# Patient Record
Sex: FEMALE | Race: WHITE | Employment: UNEMPLOYED | URBAN - NONMETROPOLITAN AREA
[De-identification: names, ages, dates, MRNs, and addresses within clinical notes are randomized per-mention and may not be internally consistent; named-entity substitution may affect disease eponyms.]

---

## 2024-07-30 ENCOUNTER — APPOINTMENT (INPATIENT)
Dept: ULTRASOUND IMAGING | Facility: HOSPITAL | Age: 44
DRG: 720 | End: 2024-07-30
Payer: COMMERCIAL

## 2024-07-30 ENCOUNTER — HOSPITAL ENCOUNTER (INPATIENT)
Facility: HOSPITAL | Age: 44
LOS: 3 days | Discharge: HOME | DRG: 720 | End: 2024-08-02
Attending: EMERGENCY MEDICINE | Admitting: HOSPITALIST
Payer: COMMERCIAL

## 2024-07-30 ENCOUNTER — APPOINTMENT (EMERGENCY)
Dept: CT IMAGING | Facility: HOSPITAL | Age: 44
DRG: 720 | End: 2024-07-30
Payer: COMMERCIAL

## 2024-07-30 ENCOUNTER — APPOINTMENT (EMERGENCY)
Dept: RADIOLOGY | Facility: HOSPITAL | Age: 44
DRG: 720 | End: 2024-07-30
Payer: COMMERCIAL

## 2024-07-30 DIAGNOSIS — N20.0 RENAL CALCULUS, LEFT: ICD-10-CM

## 2024-07-30 DIAGNOSIS — R78.81 BACTEREMIA DUE TO GRAM-NEGATIVE BACTERIA: ICD-10-CM

## 2024-07-30 DIAGNOSIS — A41.9 SEPSIS (HCC): ICD-10-CM

## 2024-07-30 DIAGNOSIS — R91.1 PULMONARY NODULE, RIGHT: ICD-10-CM

## 2024-07-30 DIAGNOSIS — E87.6 HYPOKALEMIA: ICD-10-CM

## 2024-07-30 DIAGNOSIS — N20.0 KIDNEY STONE: ICD-10-CM

## 2024-07-30 DIAGNOSIS — N12 PYELONEPHRITIS OF LEFT KIDNEY: Primary | ICD-10-CM

## 2024-07-30 PROBLEM — F41.9 ANXIETY: Status: ACTIVE | Noted: 2024-07-30

## 2024-07-30 LAB
ALBUMIN SERPL BCG-MCNC: 4.1 G/DL (ref 3.5–5)
ALP SERPL-CCNC: 68 U/L (ref 34–104)
ALT SERPL W P-5'-P-CCNC: 9 U/L (ref 7–52)
ANION GAP SERPL CALCULATED.3IONS-SCNC: 15 MMOL/L (ref 4–13)
APTT PPP: 30 SECONDS (ref 23–37)
AST SERPL W P-5'-P-CCNC: 9 U/L (ref 13–39)
BACTERIA UR QL AUTO: ABNORMAL /HPF
BASOPHILS # BLD MANUAL: 0 THOUSAND/UL (ref 0–0.1)
BASOPHILS NFR MAR MANUAL: 0 % (ref 0–1)
BILIRUB SERPL-MCNC: 1.11 MG/DL (ref 0.2–1)
BILIRUB UR QL STRIP: NEGATIVE
BUN SERPL-MCNC: 10 MG/DL (ref 5–25)
CALCIUM SERPL-MCNC: 9.8 MG/DL (ref 8.4–10.2)
CHLORIDE SERPL-SCNC: 103 MMOL/L (ref 96–108)
CLARITY UR: CLEAR
CO2 SERPL-SCNC: 15 MMOL/L (ref 21–32)
COLOR UR: YELLOW
CREAT SERPL-MCNC: 1.07 MG/DL (ref 0.6–1.3)
EOSINOPHIL # BLD MANUAL: 0 THOUSAND/UL (ref 0–0.4)
EOSINOPHIL NFR BLD MANUAL: 0 % (ref 0–6)
ERYTHROCYTE [DISTWIDTH] IN BLOOD BY AUTOMATED COUNT: 13.4 % (ref 11.6–15.1)
GFR SERPL CREATININE-BSD FRML MDRD: 63 ML/MIN/1.73SQ M
GLUCOSE SERPL-MCNC: 192 MG/DL (ref 65–140)
GLUCOSE UR STRIP-MCNC: NEGATIVE MG/DL
HCG SERPL QL: NEGATIVE
HCT VFR BLD AUTO: 36.3 % (ref 34.8–46.1)
HGB BLD-MCNC: 11.7 G/DL (ref 11.5–15.4)
HGB UR QL STRIP.AUTO: ABNORMAL
INR PPP: 1.31 (ref 0.84–1.19)
KETONES UR STRIP-MCNC: ABNORMAL MG/DL
LACTATE SERPL-SCNC: 1.1 MMOL/L (ref 0.5–2)
LACTATE SERPL-SCNC: 2.5 MMOL/L (ref 0.5–2)
LEUKOCYTE ESTERASE UR QL STRIP: ABNORMAL
LYMPHOCYTES # BLD AUTO: 0.5 THOUSAND/UL (ref 0.6–4.47)
LYMPHOCYTES # BLD AUTO: 3 % (ref 14–44)
MCH RBC QN AUTO: 26.6 PG (ref 26.8–34.3)
MCHC RBC AUTO-ENTMCNC: 32.2 G/DL (ref 31.4–37.4)
MCV RBC AUTO: 83 FL (ref 82–98)
MONOCYTES # BLD AUTO: 0.17 THOUSAND/UL (ref 0–1.22)
MONOCYTES NFR BLD: 1 % (ref 4–12)
NEUTROPHILS # BLD MANUAL: 15.96 THOUSAND/UL (ref 1.85–7.62)
NEUTS SEG NFR BLD AUTO: 96 % (ref 43–75)
NITRITE UR QL STRIP: NEGATIVE
NON-SQ EPI CELLS URNS QL MICRO: ABNORMAL /HPF
PH UR STRIP.AUTO: 5.5 [PH]
PLATELET # BLD AUTO: 207 THOUSANDS/UL (ref 149–390)
PLATELET BLD QL SMEAR: ADEQUATE
PMV BLD AUTO: 10.7 FL (ref 8.9–12.7)
POTASSIUM SERPL-SCNC: 3.2 MMOL/L (ref 3.5–5.3)
PROCALCITONIN SERPL-MCNC: 5.25 NG/ML
PROT SERPL-MCNC: 7.7 G/DL (ref 6.4–8.4)
PROT UR STRIP-MCNC: ABNORMAL MG/DL
PROTHROMBIN TIME: 16.1 SECONDS (ref 11.6–14.5)
RBC # BLD AUTO: 4.4 MILLION/UL (ref 3.81–5.12)
RBC #/AREA URNS AUTO: ABNORMAL /HPF
RBC MORPH BLD: NORMAL
SODIUM SERPL-SCNC: 133 MMOL/L (ref 135–147)
SP GR UR STRIP.AUTO: 1.01 (ref 1–1.03)
UROBILINOGEN UR STRIP-ACNC: <2 MG/DL
WBC # BLD AUTO: 16.62 THOUSAND/UL (ref 4.31–10.16)
WBC #/AREA URNS AUTO: ABNORMAL /HPF

## 2024-07-30 PROCEDURE — 80053 COMPREHEN METABOLIC PANEL: CPT | Performed by: EMERGENCY MEDICINE

## 2024-07-30 PROCEDURE — 85007 BL SMEAR W/DIFF WBC COUNT: CPT | Performed by: EMERGENCY MEDICINE

## 2024-07-30 PROCEDURE — 87154 CUL TYP ID BLD PTHGN 6+ TRGT: CPT | Performed by: PHYSICIAN ASSISTANT

## 2024-07-30 PROCEDURE — 96367 TX/PROPH/DG ADDL SEQ IV INF: CPT

## 2024-07-30 PROCEDURE — 85610 PROTHROMBIN TIME: CPT | Performed by: PHYSICIAN ASSISTANT

## 2024-07-30 PROCEDURE — 99285 EMERGENCY DEPT VISIT HI MDM: CPT

## 2024-07-30 PROCEDURE — 74018 RADEX ABDOMEN 1 VIEW: CPT

## 2024-07-30 PROCEDURE — 87040 BLOOD CULTURE FOR BACTERIA: CPT | Performed by: PHYSICIAN ASSISTANT

## 2024-07-30 PROCEDURE — 96365 THER/PROPH/DIAG IV INF INIT: CPT

## 2024-07-30 PROCEDURE — 99285 EMERGENCY DEPT VISIT HI MDM: CPT | Performed by: PHYSICIAN ASSISTANT

## 2024-07-30 PROCEDURE — 87186 SC STD MICRODIL/AGAR DIL: CPT | Performed by: HOSPITALIST

## 2024-07-30 PROCEDURE — 81001 URINALYSIS AUTO W/SCOPE: CPT | Performed by: EMERGENCY MEDICINE

## 2024-07-30 PROCEDURE — 96376 TX/PRO/DX INJ SAME DRUG ADON: CPT

## 2024-07-30 PROCEDURE — 87077 CULTURE AEROBIC IDENTIFY: CPT | Performed by: HOSPITALIST

## 2024-07-30 PROCEDURE — 84145 PROCALCITONIN (PCT): CPT | Performed by: PHYSICIAN ASSISTANT

## 2024-07-30 PROCEDURE — 96361 HYDRATE IV INFUSION ADD-ON: CPT

## 2024-07-30 PROCEDURE — 85027 COMPLETE CBC AUTOMATED: CPT | Performed by: EMERGENCY MEDICINE

## 2024-07-30 PROCEDURE — 85730 THROMBOPLASTIN TIME PARTIAL: CPT | Performed by: PHYSICIAN ASSISTANT

## 2024-07-30 PROCEDURE — 99223 1ST HOSP IP/OBS HIGH 75: CPT | Performed by: HOSPITALIST

## 2024-07-30 PROCEDURE — 76775 US EXAM ABDO BACK WALL LIM: CPT

## 2024-07-30 PROCEDURE — 99232 SBSQ HOSP IP/OBS MODERATE 35: CPT | Performed by: RADIOLOGY

## 2024-07-30 PROCEDURE — 74177 CT ABD & PELVIS W/CONTRAST: CPT

## 2024-07-30 PROCEDURE — 87186 SC STD MICRODIL/AGAR DIL: CPT | Performed by: PHYSICIAN ASSISTANT

## 2024-07-30 PROCEDURE — 93005 ELECTROCARDIOGRAM TRACING: CPT

## 2024-07-30 PROCEDURE — 84703 CHORIONIC GONADOTROPIN ASSAY: CPT | Performed by: PHYSICIAN ASSISTANT

## 2024-07-30 PROCEDURE — 83605 ASSAY OF LACTIC ACID: CPT | Performed by: PHYSICIAN ASSISTANT

## 2024-07-30 PROCEDURE — 87086 URINE CULTURE/COLONY COUNT: CPT | Performed by: HOSPITALIST

## 2024-07-30 PROCEDURE — 36415 COLL VENOUS BLD VENIPUNCTURE: CPT

## 2024-07-30 PROCEDURE — 96375 TX/PRO/DX INJ NEW DRUG ADDON: CPT

## 2024-07-30 RX ORDER — HYDROXYZINE HYDROCHLORIDE 25 MG/1
25 TABLET, FILM COATED ORAL EVERY 6 HOURS PRN
Status: DISCONTINUED | OUTPATIENT
Start: 2024-07-30 | End: 2024-08-02 | Stop reason: HOSPADM

## 2024-07-30 RX ORDER — CEFTRIAXONE 1 G/50ML
1000 INJECTION, SOLUTION INTRAVENOUS ONCE
Status: COMPLETED | OUTPATIENT
Start: 2024-07-30 | End: 2024-07-30

## 2024-07-30 RX ORDER — CEFTRIAXONE 2 G/50ML
2000 INJECTION, SOLUTION INTRAVENOUS EVERY 24 HOURS
Status: DISCONTINUED | OUTPATIENT
Start: 2024-07-31 | End: 2024-08-02 | Stop reason: HOSPADM

## 2024-07-30 RX ORDER — ONDANSETRON 2 MG/ML
4 INJECTION INTRAMUSCULAR; INTRAVENOUS EVERY 8 HOURS PRN
Status: DISCONTINUED | OUTPATIENT
Start: 2024-07-30 | End: 2024-08-01

## 2024-07-30 RX ORDER — SODIUM CHLORIDE, SODIUM LACTATE, POTASSIUM CHLORIDE, CALCIUM CHLORIDE 600; 310; 30; 20 MG/100ML; MG/100ML; MG/100ML; MG/100ML
125 INJECTION, SOLUTION INTRAVENOUS CONTINUOUS
Status: DISCONTINUED | OUTPATIENT
Start: 2024-07-30 | End: 2024-08-02 | Stop reason: HOSPADM

## 2024-07-30 RX ORDER — ONDANSETRON 2 MG/ML
4 INJECTION INTRAMUSCULAR; INTRAVENOUS ONCE
Status: COMPLETED | OUTPATIENT
Start: 2024-07-30 | End: 2024-07-30

## 2024-07-30 RX ORDER — ACETAMINOPHEN 325 MG/1
650 TABLET ORAL EVERY 6 HOURS PRN
Status: DISCONTINUED | OUTPATIENT
Start: 2024-07-30 | End: 2024-08-02 | Stop reason: HOSPADM

## 2024-07-30 RX ORDER — ENOXAPARIN SODIUM 100 MG/ML
40 INJECTION SUBCUTANEOUS DAILY
Status: DISCONTINUED | OUTPATIENT
Start: 2024-07-31 | End: 2024-08-02 | Stop reason: HOSPADM

## 2024-07-30 RX ORDER — ACETAMINOPHEN 10 MG/ML
1000 INJECTION, SOLUTION INTRAVENOUS ONCE
Status: COMPLETED | OUTPATIENT
Start: 2024-07-30 | End: 2024-07-30

## 2024-07-30 RX ORDER — POTASSIUM CHLORIDE 14.9 MG/ML
20 INJECTION INTRAVENOUS
Status: COMPLETED | OUTPATIENT
Start: 2024-07-30 | End: 2024-07-30

## 2024-07-30 RX ORDER — KETOROLAC TROMETHAMINE 30 MG/ML
15 INJECTION, SOLUTION INTRAMUSCULAR; INTRAVENOUS ONCE
Status: COMPLETED | OUTPATIENT
Start: 2024-07-30 | End: 2024-07-30

## 2024-07-30 RX ORDER — CEFTRIAXONE 2 G/50ML
2000 INJECTION, SOLUTION INTRAVENOUS ONCE
Status: DISCONTINUED | OUTPATIENT
Start: 2024-07-31 | End: 2024-07-30

## 2024-07-30 RX ADMIN — IOHEXOL 100 ML: 350 INJECTION, SOLUTION INTRAVENOUS at 13:24

## 2024-07-30 RX ADMIN — POTASSIUM CHLORIDE 20 MEQ: 14.9 INJECTION, SOLUTION INTRAVENOUS at 21:13

## 2024-07-30 RX ADMIN — ACETAMINOPHEN 650 MG: 325 TABLET, FILM COATED ORAL at 22:59

## 2024-07-30 RX ADMIN — SODIUM CHLORIDE 1000 ML: 0.9 INJECTION, SOLUTION INTRAVENOUS at 14:15

## 2024-07-30 RX ADMIN — ONDANSETRON 4 MG: 2 INJECTION INTRAMUSCULAR; INTRAVENOUS at 12:22

## 2024-07-30 RX ADMIN — POTASSIUM CHLORIDE 20 MEQ: 14.9 INJECTION, SOLUTION INTRAVENOUS at 19:41

## 2024-07-30 RX ADMIN — ONDANSETRON 4 MG: 2 INJECTION INTRAMUSCULAR; INTRAVENOUS at 21:11

## 2024-07-30 RX ADMIN — SODIUM CHLORIDE, SODIUM LACTATE, POTASSIUM CHLORIDE, AND CALCIUM CHLORIDE 125 ML/HR: .6; .31; .03; .02 INJECTION, SOLUTION INTRAVENOUS at 18:36

## 2024-07-30 RX ADMIN — SODIUM CHLORIDE 1000 ML: 0.9 INJECTION, SOLUTION INTRAVENOUS at 12:22

## 2024-07-30 RX ADMIN — CEFTRIAXONE 1000 MG: 1 INJECTION, SOLUTION INTRAVENOUS at 14:14

## 2024-07-30 RX ADMIN — KETOROLAC TROMETHAMINE 15 MG: 30 INJECTION, SOLUTION INTRAMUSCULAR at 13:26

## 2024-07-30 RX ADMIN — ONDANSETRON 4 MG: 2 INJECTION INTRAMUSCULAR; INTRAVENOUS at 13:40

## 2024-07-30 RX ADMIN — ACETAMINOPHEN 1000 MG: 1000 INJECTION, SOLUTION INTRAVENOUS at 12:38

## 2024-07-30 NOTE — PLAN OF CARE
Problem: PAIN - ADULT  Goal: Verbalizes/displays adequate comfort level or baseline comfort level  Description: Interventions:  - Encourage patient to monitor pain and request assistance  - Assess pain using appropriate pain scale  - Administer analgesics based on type and severity of pain and evaluate response  - Implement non-pharmacological measures as appropriate and evaluate response  - Consider cultural and social influences on pain and pain management  - Notify physician/advanced practitioner if interventions unsuccessful or patient reports new pain  Outcome: Progressing     Problem: DISCHARGE PLANNING  Goal: Discharge to home or other facility with appropriate resources  Description: INTERVENTIONS:  - Identify barriers to discharge w/patient and caregiver  - Arrange for needed discharge resources and transportation as appropriate  - Identify discharge learning needs (meds, wound care, etc.)  - Arrange for interpretive services to assist at discharge as needed  - Refer to Case Management Department for coordinating discharge planning if the patient needs post-hospital services based on physician/advanced practitioner order or complex needs related to functional status, cognitive ability, or social support system  Outcome: Progressing     Problem: Knowledge Deficit  Goal: Patient/family/caregiver demonstrates understanding of disease process, treatment plan, medications, and discharge instructions  Description: Complete learning assessment and assess knowledge base.  Interventions:  - Provide teaching at level of understanding  - Provide teaching via preferred learning methods  Outcome: Progressing     Problem: RESPIRATORY - ADULT  Goal: Achieves optimal ventilation and oxygenation  Description: INTERVENTIONS:  - Assess for changes in respiratory status  - Assess for changes in mentation and behavior  - Position to facilitate oxygenation and minimize respiratory effort  - Oxygen administered by appropriate  delivery if ordered  - Initiate smoking cessation education as indicated  - Encourage broncho-pulmonary hygiene including cough, deep breathe, Incentive Spirometry  - Assess the need for suctioning and aspirate as needed  - Assess and instruct to report SOB or any respiratory difficulty  - Respiratory Therapy support as indicated  Outcome: Progressing     Problem: METABOLIC, FLUID AND ELECTROLYTES - ADULT  Goal: Electrolytes maintained within normal limits  Description: INTERVENTIONS:  - Monitor labs and assess patient for signs and symptoms of electrolyte imbalances  - Administer electrolyte replacement as ordered  - Monitor response to electrolyte replacements, including repeat lab results as appropriate  - Instruct patient on fluid and nutrition as appropriate  Outcome: Progressing  Goal: Fluid balance maintained  Description: INTERVENTIONS:  - Monitor labs   - Monitor I/O and WT  - Instruct patient on fluid and nutrition as appropriate  - Assess for signs & symptoms of volume excess or deficit  Outcome: Progressing     Problem: MUSCULOSKELETAL - ADULT  Goal: Maintain or return mobility to safest level of function  Description: INTERVENTIONS:  - Assess patient's ability to carry out ADLs; assess patient's baseline for ADL function and identify physical deficits which impact ability to perform ADLs (bathing, care of mouth/teeth, toileting, grooming, dressing, etc.)  - Assess/evaluate cause of self-care deficits   - Assess range of motion  - Assess patient's mobility  - Assess patient's need for assistive devices and provide as appropriate  - Encourage maximum independence but intervene and supervise when necessary  - Involve family in performance of ADLs  - Assess for home care needs following discharge   - Consider OT consult to assist with ADL evaluation and planning for discharge  - Provide patient education as appropriate  Outcome: Progressing  Goal: Maintain proper alignment of affected body  part  Description: INTERVENTIONS:  - Support, maintain and protect limb and body alignment  - Provide patient/ family with appropriate education  Outcome: Progressing      No

## 2024-07-30 NOTE — ED PROVIDER NOTES
History  Chief Complaint   Patient presents with    Flank Pain     Pt c/o left sided flank pain that radiates to the front since , pt has hx of kidney stones. Pt c/o vomiting and chills since yesterday      Patient is a 44-year-old female presenting to the ED for evaluation of left flank pain x 3 days.  Patient states that she woke up  with left-sided flank pain that has been progressively worsening over the past few days.  She states that the pain intermittently radiates around to the left side of the abdomen.  She reports associated nausea and has had multiple episodes of nonbloody/nonbilious vomiting.  She has also had subjective fevers and chills.  She denies any diarrhea, constipation or urinary symptoms.  She reports a history of kidney stones but states that this feels slightly different.        None       History reviewed. No pertinent past medical history.    Past Surgical History:   Procedure Laterality Date     SECTION             History reviewed. No pertinent family history.  I have reviewed and agree with the history as documented.    E-Cigarette/Vaping     E-Cigarette/Vaping Substances     Social History     Tobacco Use    Smoking status: Former     Current packs/day: 0.00     Types: Cigarettes     Quit date:      Years since quittin.5    Smokeless tobacco: Never       Review of Systems   Constitutional:  Positive for chills and fever.   HENT:  Negative for congestion, rhinorrhea and sore throat.    Eyes:  Negative for visual disturbance.   Respiratory:  Negative for cough and shortness of breath.    Cardiovascular:  Negative for chest pain, palpitations and leg swelling.   Gastrointestinal:  Positive for abdominal pain, nausea and vomiting. Negative for constipation and diarrhea.   Genitourinary:  Positive for flank pain. Negative for dysuria, frequency and hematuria.   Musculoskeletal:  Negative for back pain and neck pain.   Skin:  Negative for rash.   Neurological:   Negative for dizziness, syncope, weakness and headaches.   All other systems reviewed and are negative.      Physical Exam  Physical Exam  Vitals and nursing note reviewed.   Constitutional:       General: She is awake.      Appearance: Normal appearance. She is well-developed. She is not toxic-appearing or diaphoretic.   HENT:      Head: Normocephalic and atraumatic.      Right Ear: External ear normal.      Left Ear: External ear normal.      Nose: Nose normal.      Mouth/Throat:      Lips: Pink.      Mouth: Mucous membranes are moist.   Eyes:      General: Lids are normal. No scleral icterus.     Conjunctiva/sclera: Conjunctivae normal.      Pupils: Pupils are equal, round, and reactive to light.   Cardiovascular:      Rate and Rhythm: Regular rhythm. Tachycardia present.      Pulses: Normal pulses.           Radial pulses are 2+ on the right side and 2+ on the left side.      Heart sounds: Normal heart sounds, S1 normal and S2 normal.   Pulmonary:      Effort: Pulmonary effort is normal. No accessory muscle usage.      Breath sounds: Normal breath sounds. No stridor. No decreased breath sounds, wheezing, rhonchi or rales.   Abdominal:      General: Abdomen is flat. Bowel sounds are normal. There is no distension.      Palpations: Abdomen is soft.      Tenderness: There is abdominal tenderness in the left lower quadrant. There is left CVA tenderness. There is no right CVA tenderness, guarding or rebound.   Musculoskeletal:      Cervical back: Full passive range of motion without pain and neck supple. No signs of trauma. No pain with movement.      Right lower leg: No edema.      Left lower leg: No edema.   Lymphadenopathy:      Cervical: No cervical adenopathy.   Skin:     General: Skin is warm and dry.      Capillary Refill: Capillary refill takes less than 2 seconds.      Coloration: Skin is not cyanotic, jaundiced or pale.   Neurological:      Mental Status: She is alert and oriented to person, place, and  time.      GCS: GCS eye subscore is 4. GCS verbal subscore is 5. GCS motor subscore is 6.      Gait: Gait normal.   Psychiatric:         Mood and Affect: Mood normal.         Speech: Speech normal.         Behavior: Behavior is cooperative.         Vital Signs  ED Triage Vitals   Temperature Pulse Respirations Blood Pressure SpO2   07/30/24 1205 07/30/24 1200 07/30/24 1200 07/30/24 1200 07/30/24 1200   100.5 °F (38.1 °C) (!) 129 20 125/78 97 %      Temp Source Heart Rate Source Patient Position - Orthostatic VS BP Location FiO2 (%)   07/30/24 1205 07/30/24 1200 07/30/24 1200 07/30/24 1200 --   Oral Monitor Lying Left arm       Pain Score       07/30/24 1200       6           Vitals:    07/30/24 1230 07/30/24 1532 07/30/24 1748 07/30/24 1821   BP: 137/71 112/71 115/79 113/79   Pulse: (!) 117 89 80 86   Patient Position - Orthostatic VS: Sitting   Sitting         Visual Acuity      ED Medications  Medications   lactated ringers infusion (125 mL/hr Intravenous New Bag 7/30/24 1836)   enoxaparin (LOVENOX) subcutaneous injection 40 mg (has no administration in time range)   hydrOXYzine HCL (ATARAX) tablet 25 mg (has no administration in time range)   ondansetron (ZOFRAN) injection 4 mg (has no administration in time range)   cefTRIAXone (ROCEPHIN) IVPB (premix in dextrose) 2,000 mg 50 mL (has no administration in time range)   potassium chloride 20 mEq IVPB (premix) (has no administration in time range)   sodium chloride 0.9 % bolus 1,000 mL (0 mL Intravenous Stopped 7/30/24 1324)   ondansetron (ZOFRAN) injection 4 mg (4 mg Intravenous Given 7/30/24 1222)   acetaminophen (Ofirmev) injection 1,000 mg (0 mg Intravenous Stopped 7/30/24 1324)   ketorolac (TORADOL) injection 15 mg (15 mg Intravenous Given 7/30/24 1326)   iohexol (OMNIPAQUE) 350 MG/ML injection (SINGLE-DOSE) 100 mL (100 mL Intravenous Given 7/30/24 1324)   ondansetron (ZOFRAN) injection 4 mg (4 mg Intravenous Given 7/30/24 1340)   sodium chloride 0.9 % bolus  1,000 mL (0 mL Intravenous Stopped 7/30/24 1515)   cefTRIAXone (ROCEPHIN) IVPB (premix in dextrose) 1,000 mg 50 mL (0 mg Intravenous Stopped 7/30/24 1444)       Diagnostic Studies  Results Reviewed       Procedure Component Value Units Date/Time    Lactic acid 2 Hours [[id number]]  (Normal) Collected: 07/30/24 1501    Lab Status: Final result Specimen: Blood from Arm, Right Updated: 07/30/24 1520     LACTIC ACID 1.1 mmol/L     Narrative:      Result may be elevated if tourniquet was used during collection.    Urine Microscopic [[id number]]  (Abnormal) Collected: 07/30/24 1348    Lab Status: Final result Specimen: Urine, Clean Catch Updated: 07/30/24 1406     RBC, UA 4-10 /hpf      WBC, UA 4-10 /hpf      Epithelial Cells Occasional /hpf      Bacteria, UA Occasional /hpf     UA w Reflex to Microscopic w Reflex to Culture [[id number]]  (Abnormal) Collected: 07/30/24 1348    Lab Status: Final result Specimen: Urine, Clean Catch Updated: 07/30/24 1355     Color, UA Yellow     Clarity, UA Clear     Specific Gravity, UA 1.010     pH, UA 5.5     Leukocytes, UA Moderate     Nitrite, UA Negative     Protein, UA 30 (1+) mg/dl      Glucose, UA Negative mg/dl      Ketones, UA 40 (2+) mg/dl      Urobilinogen, UA <2.0 mg/dl      Bilirubin, UA Negative     Occult Blood, UA Small    Procalcitonin [[id number]]  (Abnormal) Collected: 07/30/24 1228    Lab Status: Final result Specimen: Blood Updated: 07/30/24 1306     Procalcitonin 5.25 ng/ml     hCG, qualitative pregnancy [[id number]]  (Normal) Collected: 07/30/24 1228    Lab Status: Final result Specimen: Blood Updated: 07/30/24 1301     Preg, Serum Negative    RBC Morphology Reflex Test [[id number]] Collected: 07/30/24 1213    Lab Status: Final result Specimen: Blood from Arm, Right Updated: 07/30/24 1301    Lactic acid [[id number]]  (Abnormal) Collected: 07/30/24 1221    Lab Status: Final result Specimen: Blood Updated: 07/30/24 1252     LACTIC ACID 2.5 mmol/L     Narrative:      Result  may be elevated if tourniquet was used during collection.    APTT [[id number]]  (Normal) Collected: 07/30/24 1228    Lab Status: Final result Specimen: Blood from Arm, Left Updated: 07/30/24 1250     PTT 30 seconds     Protime-INR [[id number]]  (Abnormal) Collected: 07/30/24 1228    Lab Status: Final result Specimen: Blood from Arm, Left Updated: 07/30/24 1250     Protime 16.1 seconds      INR 1.31    CBC and differential [[id number]]  (Abnormal) Collected: 07/30/24 1213    Lab Status: Final result Specimen: Blood from Arm, Right Updated: 07/30/24 1250     WBC 16.62 Thousand/uL      RBC 4.40 Million/uL      Hemoglobin 11.7 g/dL      Hematocrit 36.3 %      MCV 83 fL      MCH 26.6 pg      MCHC 32.2 g/dL      RDW 13.4 %      MPV 10.7 fL      Platelets 207 Thousands/uL     Narrative:      This is an appended report.  These results have been appended to a previously verified report.    Manual Differential(PHLEBS Do Not Order) [[id number]]  (Abnormal) Collected: 07/30/24 1213    Lab Status: Final result Specimen: Blood from Arm, Right Updated: 07/30/24 1250     Segmented % 96 %      Lymphocytes % 3 %      Monocytes % 1 %      Eosinophils % 0 %      Basophils % 0 %      Absolute Neutrophils 15.96 Thousand/uL      Absolute Lymphocytes 0.50 Thousand/uL      Absolute Monocytes 0.17 Thousand/uL      Absolute Eosinophils 0.00 Thousand/uL      Absolute Basophils 0.00 Thousand/uL      Total Counted --     RBC Morphology Normal     Platelet Estimate Adequate    Comprehensive metabolic panel [[id number]]  (Abnormal) Collected: 07/30/24 1214    Lab Status: Final result Specimen: Blood from Arm, Right Updated: 07/30/24 1236     Sodium 133 mmol/L      Potassium 3.2 mmol/L      Chloride 103 mmol/L      CO2 15 mmol/L      ANION GAP 15 mmol/L      BUN 10 mg/dL      Creatinine 1.07 mg/dL      Glucose 192 mg/dL      Calcium 9.8 mg/dL      AST 9 U/L      ALT 9 U/L      Alkaline Phosphatase 68 U/L      Total Protein 7.7 g/dL      Albumin 4.1 g/dL       Total Bilirubin 1.11 mg/dL      eGFR 63 ml/min/1.73sq m     Narrative:      [employer] guidelines for Chronic Kidney Disease (CKD):     Stage 1 with normal or high GFR (GFR > 90 mL/min/1.73 square meters)    Stage 2 Mild CKD (GFR = 60-89 mL/min/1.73 square meters)    Stage 3A Moderate CKD (GFR = 45-59 mL/min/1.73 square meters)    Stage 3B Moderate CKD (GFR = 30-44 mL/min/1.73 square meters)    Stage 4 Severe CKD (GFR = 15-29 mL/min/1.73 square meters)    Stage 5 End Stage CKD (GFR <15 mL/min/1.73 square meters)  Note: GFR calculation is accurate only with a steady state creatinine    Blood culture #2 [[id number]] Collected: 07/30/24 1228    Lab Status: In process Specimen: Blood from Hand, Left Updated: 07/30/24 1236    Blood culture #1 [[id number]] Collected: 07/30/24 1228    Lab Status: In process Specimen: Blood from Arm, Right Updated: 07/30/24 1236                   CT abdomen pelvis with contrast   Final Result by Tc Mujica MD (07/30 1433)      1.  Moderate left renal pelvocaliectasis, with heterogeneous enhancement throughout the left renal parenchyma. While there is no discrete obstructing calculus, there is nonspecific urothelial thickening and hyperemia at the left ureterovesicular    junction. While this potentially represents a recently passed calculus, the focal urothelial thickening could alternatively represent inflammation or less likely focal malignancy. The renal parenchymal heterogeneity is concerning for potential    superimposed pyelonephritis.   2.  There is an additional nonobstructing 8 mm left renal calculus.   3.  There are 2 small pulmonary nodules within the right lower lobe, the larger of which measures 4 mm. A baseline follow-up chest CT is recommended for further evaluation.         Workstation performed: [id number]         XR abdomen 1 view kub    (Results Pending)   US kidney and bladder    (Results Pending)         "      Procedures  Procedures         ED Course  ED Course as of 07/30/24 1845 Tue Jul 30, 2024   1245 WBC(!): 16.62   1246 Pulse(!): 129   1246 Temperature: 100.5 °F (38.1 °C)   1246 Potassium(!): 3.2   1246 ANION GAP(!): 15   1246 GLUCOSE(!): 192   1254 LACTIC ACID(!): 2.5   1308 Procalcitonin(!): 5.25                              Initial Sepsis Screening       Row Name 07/30/24 1820 07/30/24 1246             Is the patient's history suggestive of a new or worsening infection? Yes (Proceed)  -DC Yes (Proceed)  -       Suspected source of infection urinary tract infection  -DC urinary tract infection;acute abdominal infection  -LH       Indicate SIRS criteria Tachycardia > 90 bpm;Leukocytosis (WBC > 80508 IJL) OR Leukopenia (WBC <4000 IJL) OR Bandemia (WBC >10% bands)  -DC Hyperthemia > 38.3C (100.9F) OR Hypothermia <36C (96.8F);Tachycardia > 90 bpm;Leukocytosis (WBC > 78877 IJL) OR Leukopenia (WBC <4000 IJL) OR Bandemia (WBC >10% bands)  -       Are two or more of the above signs & symptoms of infection both present and new to the patient? Yes (Proceed)  -DC Yes (Proceed)  -       Assess for evidence of organ dysfunction: Are any of the below criteria present within 6 hours of suspected infection and SIRS criteria that are NOT considered to be chronic conditions? Lactate > 2.0  -DC --       Date of presentation of severe sepsis 07/30/24  -DC --       Time of presentation of severe sepsis 1823  -DC --       Sepsis Note: Click \"NEXT\" below (NOT \"close\") to generate sepsis note based on above information. YES (proceed by clicking \"NEXT\")  -DC --                 User Key  (r) = Recorded By, (t) = Taken By, (c) = Cosigned By      Initials Name Provider Type     Kanwal Sharma PA-C Physician Assistant    CHAUNCEY Payne Counts, DO Physician                    SBIRT 20yo+      Flowsheet Row Most Recent Value   Initial Alcohol Screen: US AUDIT-C     1. How often do you have a drink containing alcohol? 0 " Filed at: 07/30/2024 1203   2. How many drinks containing alcohol do you have on a typical day you are drinking?  0 Filed at: 07/30/2024 1203   3a. Male UNDER 65: How often do you have five or more drinks on one occasion? 0 Filed at: 07/30/2024 1203   3b. FEMALE Any Age, or MALE 65+: How often do you have 4 or more drinks on one occassion? 0 Filed at: 07/30/2024 1203   Audit-C Score 0 Filed at: 07/30/2024 1203   ARACELIS: How many times in the past year have you...    Used an illegal drug or used a prescription medication for non-medical reasons? Never Filed at: 07/30/2024 1203                      Medical Decision Making  Patient is a 44-year-old female presenting to the ED for evaluation of left flank pain x 3 days.      DDx including but not limited to: renal colic, pyelonephritis, UTI, musculoskeletal, GI etiology.     Patient met sepsis criteria with a fever, tachycardia, leukocytosis (WBC count 16.62) and an elevated lactic acid of 2.5 secondary to pyelonephritis seen on CT. CT showed findings consistent with pyelonephritis as well as moderate left renal pelvocaliectasis and moderate dilation of the left ureter with no discrete obstructing calculus.  The case was discussed with urology and IR who were considering a percutaneous nephrostomy tube; however, patient had significant improvement of symptoms and appeared to have passed the stone so it was ultimately recommended that she be admitted for observation and continued IV antibiotics.  Patient was given initial dose of IV antibiotics in the ED and admitted central medicine for continued observation and management.    Amount and/or Complexity of Data Reviewed  Labs: ordered. Decision-making details documented in ED Course.  Radiology: ordered.  Discussion of management or test interpretation with external provider(s): Mandie Cook PA-C and Dr. Tao (Urology)  Dr. Moon (IR)    Risk  Prescription drug management.  Decision regarding  hospitalization.                 Disposition  Final diagnoses:   Pyelonephritis of left kidney   Sepsis (HCC)   Hypokalemia   Renal calculus, left   Pulmonary nodule, right     Time reflects when diagnosis was documented in both MDM as applicable and the Disposition within this note       Time User Action Codes Description Comment    7/30/2024  4:28 PM Kanwal Sharma [N12] Pyelonephritis of left kidney     7/30/2024  4:28 PM Kanwal Sharma [A41.9] Sepsis (HCC)     7/30/2024  4:29 PM Kanwal Sharma [E87.6] Hypokalemia     7/30/2024  4:29 PM Kanwal Sharma Add [N20.0] Renal calculus, left     7/30/2024  4:29 PM Kanwal Sharma [R91.1] Pulmonary nodule, right           ED Disposition       ED Disposition   Admit    Condition   Stable    Date/Time   Tue Jul 30, 2024 0132    Comment   Case was discussed with PRAVEENA and the patient's admission status was agreed to be Admission Status: Inpatient status to the service of Dr. Lambert.               Follow-up Information    None         There are no discharge medications for this patient.      No discharge procedures on file.    PDMP Review       None            ED Provider  Electronically Signed by             Kanwal Sharma PA-C  07/30/24 3155

## 2024-07-30 NOTE — H&P
[facility]  H&P  Name: Leigh Torres 44 y.o. female I MRN: [id number]  Unit/Bed#: 419-01 I Date of Admission: 7/30/2024   Date of Service: 7/30/2024 I Hospital Day: 0      Assessment & Plan   * Sepsis without acute organ dysfunction (HCC)  Assessment & Plan  Tachycardic, leukocytosis, lactic acid 2.5  Suspected source pyelonephritis  Procalcitonin 5.25  Lactic acid improved after IVF in ED  Patient did receive 30 cc/kg in ED with 2L IVF, IV Tylenol, and IV Antibiotics  Suspect some of her sepsis criteria may have been mediated by dehydration, N/V, and pain however with suspected infection and notable procalcitonin  See under pyelonephritis  Blood cultures pending      Pyelonephritis  Assessment & Plan  Suspected, due to possible recently passed kidney stone  CT A/P: there is nonspecific urothelial thickening and hyperemia at the left ureterovesicular junction. While this potentially represents a recently passed calculus, the focal urothelial thickening could alternatively represent inflammation or less likely focal malignancy. The renal parenchymal heterogeneity is concerning for potential superimposed pyelonephritis.  IV Ceftriaxone  UA with only occasional bacteria, WBC and RBC present possibly in setting recent passed stone  Would continue empiric treatment for potential UTI/Pyelonephritis  Urine culture  Trend CBC, VS  IVF    Kidney stone  Assessment & Plan  Presents with 2 days left flank pain, N/V  CT A/P shows evidence of possible recently passed kidney stone, she also has additional nonobstructing 8 mm left renal calculus.  Follow up KUB was reviewed by IR, and appears to have b/l excretion  Symptoms improving, near resolved  Will need Urology follow up given kidney stone  Will repeat Renal US in the morning to assess ureters/kidney for improvement  IVF      Hypokalemia  Assessment & Plan  Suspect due to N/V  Replete  Follow BMP in morning    Pulmonary nodule  Assessment &  Plan  Incidental findings  There are 2 small pulmonary nodules within the right lower lobe, the larger of which measures 4 mm.   Follow up Chest CT as outpatient    Anxiety  Assessment & Plan  Uses medical marijuana  Will utilize PRN atarax           VTE Pharmacologic Prophylaxis: VTE Score: 3 Moderate Risk (Score 3-4) - Pharmacological DVT Prophylaxis Ordered: enoxaparin (Lovenox).  Code Status: Level 1 - Full Code   Discussion with family: Patient declined call to .     Anticipated Length of Stay: Patient will be admitted on an inpatient basis with an anticipated length of stay of greater than 2 midnights secondary to sepsis, pyelo, kidney stone.    Total Time Spent on Date of Encounter in care of patient: 45 mins. This time was spent on one or more of the following: performing physical exam; counseling and coordination of care; obtaining or reviewing history; documenting in the medical record; reviewing/ordering tests, medications or procedures; communicating with other healthcare professionals and discussing with patient's family/caregivers.    Chief Complaint: flank pain    History of Present Illness:  Leigh Torres is a 44 y.o. female with a PMH of anxiety and Hx of kidney stones who presents with left flank pain.  Patient has been having left flank pain for 48 hours prior to admission, progressively worsening, associated with nausea and vomiting and poor oral intake due to nausea.  Patient has been taking NSAIDs to relieve the pain with little improvement.  She reports she has had some intermittent discomfort with urinating but not necessarily burning sensation or increased frequency or urgency, consistent with her prior kidney stones.  Patient presented to the emergency department and CT of the abdomen showed abnormal findings consistent with possible recently passed kidney stone versus possible superimposed pyelonephritis.  Patient did meet SIRS criteria and considered septic.  Emergency  department spoke with urology and interventional radiology, who were considering percutaneous nephrostomy tube, however reviewed the case further and given patient had notable improvement of symptoms and appeared to have passed her kidney stone, no further intervention at this time and recommended further observation.    Review of Systems:  Review of Systems   Constitutional:  Positive for chills. Negative for fever.   HENT:  Negative for ear pain and sore throat.    Eyes:  Negative for pain and visual disturbance.   Respiratory:  Negative for cough and shortness of breath.    Cardiovascular:  Negative for chest pain and palpitations.   Gastrointestinal:  Positive for abdominal pain, nausea and vomiting.   Genitourinary:  Positive for hematuria. Negative for dysuria.   Musculoskeletal:  Negative for arthralgias and back pain.   Skin:  Negative for color change and rash.   Neurological:  Negative for seizures and syncope.   All other systems reviewed and are negative.      Past Medical and Surgical History:   History reviewed. No pertinent past medical history.    Past Surgical History:   Procedure Laterality Date     SECTION             Meds/Allergies:  Prior to Admission medications    Not on File     I have reviewed home medications with patient personally.    Allergies:   Allergies   Allergen Reactions    Shellfish-Derived Products - Food Allergy Anaphylaxis       Social History:  Marital Status: /Civil Union   Occupation: n/a  Patient Pre-hospital Living Situation: Home  Patient Pre-hospital Level of Mobility: walks  Patient Pre-hospital Diet Restrictions: none  Substance Use History:   Social History     Substance and Sexual Activity   Alcohol Use None     Social History     Tobacco Use   Smoking Status Former    Current packs/day: 0.00    Types: Cigarettes    Quit date:     Years since quittin.5   Smokeless Tobacco Never     Social History     Substance and Sexual Activity   Drug Use  "Not on file       Family History:  History reviewed. No pertinent family history.    Physical Exam:     Vitals:   Blood Pressure: 113/79 (07/30/24 1821)  Pulse: 86 (07/30/24 1821)  Temperature: 97.9 °F (36.6 °C) (07/30/24 1821)  Temp Source: Oral (07/30/24 1821)  Respirations: 20 (07/30/24 1821)  Height: 5' 2\" (157.5 cm) (07/30/24 1821)  Weight - Scale: 83.7 kg (184 lb 8.4 oz) (07/30/24 1821)  SpO2: 99 % (07/30/24 1821)    Physical Exam  Vitals and nursing note reviewed.   Constitutional:       General: She is not in acute distress.     Appearance: She is well-developed.   HENT:      Head: Normocephalic and atraumatic.   Eyes:      Conjunctiva/sclera: Conjunctivae normal.   Cardiovascular:      Rate and Rhythm: Normal rate and regular rhythm.      Heart sounds: No murmur heard.  Pulmonary:      Effort: Pulmonary effort is normal. No respiratory distress.      Breath sounds: Normal breath sounds.   Abdominal:      Palpations: Abdomen is soft.      Comments: Mild abdominal tenderness over the left flank   Musculoskeletal:         General: No swelling.      Cervical back: Neck supple.   Skin:     General: Skin is warm and dry.      Capillary Refill: Capillary refill takes less than 2 seconds.   Neurological:      Mental Status: She is alert.   Psychiatric:         Mood and Affect: Mood normal.          Additional Data:     Lab Results:  Results from last 7 days   Lab Units 07/30/24  1213   WBC Thousand/uL 16.62*   HEMOGLOBIN g/dL 11.7   HEMATOCRIT % 36.3   PLATELETS Thousands/uL 207   LYMPHO PCT % 3*   MONO PCT % 1*   EOS PCT % 0     Results from last 7 days   Lab Units 07/30/24  1214   SODIUM mmol/L 133*   POTASSIUM mmol/L 3.2*   CHLORIDE mmol/L 103   CO2 mmol/L 15*   BUN mg/dL 10   CREATININE mg/dL 1.07   ANION GAP mmol/L 15*   CALCIUM mg/dL 9.8   ALBUMIN g/dL 4.1   TOTAL BILIRUBIN mg/dL 1.11*   ALK PHOS U/L 68   ALT U/L 9   AST U/L 9*   GLUCOSE RANDOM mg/dL 192*     Results from last 7 days   Lab Units " "07/30/24  1228   INR  1.31*         No results found for: \"HGBA1C\"  Results from last 7 days   Lab Units 07/30/24  1501 07/30/24  1228 07/30/24  1221   LACTIC ACID mmol/L 1.1  --  2.5*   PROCALCITONIN ng/ml  --  5.25*  --        Lines/Drains:  Invasive Devices       Peripheral Intravenous Line  Duration             Peripheral IV 07/30/24 Right Antecubital <1 day                        Imaging: Reviewed radiology reports from this admission including: abdominal/pelvic CT and xray(s)  CT abdomen pelvis with contrast   Final Result by Tc Mujica MD (07/30 1433)      1.  Moderate left renal pelvocaliectasis, with heterogeneous enhancement throughout the left renal parenchyma. While there is no discrete obstructing calculus, there is nonspecific urothelial thickening and hyperemia at the left ureterovesicular    junction. While this potentially represents a recently passed calculus, the focal urothelial thickening could alternatively represent inflammation or less likely focal malignancy. The renal parenchymal heterogeneity is concerning for potential    superimposed pyelonephritis.   2.  There is an additional nonobstructing 8 mm left renal calculus.   3.  There are 2 small pulmonary nodules within the right lower lobe, the larger of which measures 4 mm. A baseline follow-up chest CT is recommended for further evaluation.         Workstation performed: [id number]         XR abdomen 1 view kub    (Results Pending)   US kidney and bladder    (Results Pending)       EKG and Other Studies Reviewed on Admission:   EKG: NSR. .    ** Please Note: This note has been constructed using a voice recognition system. **    "

## 2024-07-30 NOTE — ASSESSMENT & PLAN NOTE
Incidental findings  There are 2 small pulmonary nodules within the right lower lobe, the larger of which measures 4 mm.   Follow up Chest CT as outpatient

## 2024-07-30 NOTE — ASSESSMENT & PLAN NOTE
Tachycardic, leukocytosis, lactic acid 2.5  Suspected source pyelonephritis  Procalcitonin 5.25  Lactic acid improved after IVF in ED  Patient did receive 30 cc/kg in ED with 2L IVF, IV Tylenol, and IV Antibiotics  Suspect some of her sepsis criteria may have been mediated by dehydration, N/V, and pain however with suspected infection and notable procalcitonin  See under pyelonephritis  Blood cultures pending

## 2024-07-30 NOTE — Clinical Note
Case was discussed with PRAVEENA and the patient's admission status was agreed to be Admission Status: observation status to the service of Dr. Lambert.

## 2024-07-30 NOTE — ASSESSMENT & PLAN NOTE
Suspected, due to possible recently passed kidney stone  CT A/P: there is nonspecific urothelial thickening and hyperemia at the left ureterovesicular junction. While this potentially represents a recently passed calculus, the focal urothelial thickening could alternatively represent inflammation or less likely focal malignancy. The renal parenchymal heterogeneity is concerning for potential superimposed pyelonephritis.  IV Ceftriaxone  UA with only occasional bacteria, WBC and RBC present possibly in setting recent passed stone  Would continue empiric treatment for potential UTI/Pyelonephritis  Urine culture  Trend CBC, VS  IVF

## 2024-07-30 NOTE — PROGRESS NOTES
Patient who presented with left-sided abdomen pain.  History of kidney stones.  CT scan shows left-sided hydronephrosis without obstructing lesion.  There is some atypical enhancement at the ureterovesical junction.  There is some residual stone within the renal pelvis.  When I went down to see her, she was feeling better.  I came back a little while later.  She is comfortable at rest.  Pain was resolved.  Her nausea is waning.    I had a long discussion with her about the options including transfer for retrograde stent, nephrostomy, here, today, versus watch and wait.    I got an abdomen film.  There is faint contrast on the left.  It is difficult to compare the plain film to the CT, but I believe the hydronephrosis may be slightly less.  It certainly is not worse.  The study was done portably.  I asked for an in department study, for better resolution.    At this point, I am uncertain if she passed a stone, and there is abnormal enhancement of the distal ureter.  As I reviewed with the patient, and her significant other, it is also possible that she has a primary abnormality of the distal ureter, which has resulted in stone formation.  She will need follow-up of the distal ureter.    For right now, I think she has passed a stone and I am comfortable with observation.  In the long-term, she will need follow-up.  Emergency and internal medicine will discuss treatment for pyelonephritis.

## 2024-07-30 NOTE — ASSESSMENT & PLAN NOTE
Presents with 2 days left flank pain, N/V  CT A/P shows evidence of possible recently passed kidney stone, she also has additional nonobstructing 8 mm left renal calculus.  Follow up KUB was reviewed by IR, and appears to have b/l excretion  Symptoms improving, near resolved  Will need Urology follow up given kidney stone  Will repeat Renal US in the morning to assess ureters/kidney for improvement  IVF

## 2024-07-31 ENCOUNTER — TELEPHONE (OUTPATIENT)
Dept: OTHER | Facility: HOSPITAL | Age: 44
End: 2024-07-31

## 2024-07-31 DIAGNOSIS — N28.9 LESION OF LEFT NATIVE URETER: Primary | ICD-10-CM

## 2024-07-31 PROBLEM — R78.81 BACTEREMIA DUE TO GRAM-NEGATIVE BACTERIA: Status: ACTIVE | Noted: 2024-07-31

## 2024-07-31 LAB
ANION GAP SERPL CALCULATED.3IONS-SCNC: 10 MMOL/L (ref 4–13)
ATRIAL RATE: 107 BPM
BUN SERPL-MCNC: 9 MG/DL (ref 5–25)
CALCIUM SERPL-MCNC: 9.2 MG/DL (ref 8.4–10.2)
CHLORIDE SERPL-SCNC: 107 MMOL/L (ref 96–108)
CO2 SERPL-SCNC: 19 MMOL/L (ref 21–32)
CREAT SERPL-MCNC: 0.95 MG/DL (ref 0.6–1.3)
ERYTHROCYTE [DISTWIDTH] IN BLOOD BY AUTOMATED COUNT: 13.5 % (ref 11.6–15.1)
GFR SERPL CREATININE-BSD FRML MDRD: 73 ML/MIN/1.73SQ M
GLUCOSE SERPL-MCNC: 111 MG/DL (ref 65–140)
HCT VFR BLD AUTO: 32.1 % (ref 34.8–46.1)
HGB BLD-MCNC: 10.4 G/DL (ref 11.5–15.4)
MCH RBC QN AUTO: 26.9 PG (ref 26.8–34.3)
MCHC RBC AUTO-ENTMCNC: 32.4 G/DL (ref 31.4–37.4)
MCV RBC AUTO: 83 FL (ref 82–98)
P AXIS: 57 DEGREES
PLATELET # BLD AUTO: 191 THOUSANDS/UL (ref 149–390)
PMV BLD AUTO: 11.7 FL (ref 8.9–12.7)
POTASSIUM SERPL-SCNC: 4 MMOL/L (ref 3.5–5.3)
PR INTERVAL: 146 MS
QRS AXIS: 60 DEGREES
QRSD INTERVAL: 82 MS
QT INTERVAL: 338 MS
QTC INTERVAL: 451 MS
RBC # BLD AUTO: 3.87 MILLION/UL (ref 3.81–5.12)
SODIUM SERPL-SCNC: 136 MMOL/L (ref 135–147)
T WAVE AXIS: 23 DEGREES
VENTRICULAR RATE: 107 BPM
WBC # BLD AUTO: 13.28 THOUSAND/UL (ref 4.31–10.16)

## 2024-07-31 PROCEDURE — 99233 SBSQ HOSP IP/OBS HIGH 50: CPT | Performed by: HOSPITALIST

## 2024-07-31 PROCEDURE — 93010 ELECTROCARDIOGRAM REPORT: CPT | Performed by: INTERNAL MEDICINE

## 2024-07-31 PROCEDURE — 80048 BASIC METABOLIC PNL TOTAL CA: CPT | Performed by: HOSPITALIST

## 2024-07-31 PROCEDURE — 99223 1ST HOSP IP/OBS HIGH 75: CPT | Performed by: PHYSICIAN ASSISTANT

## 2024-07-31 PROCEDURE — 85027 COMPLETE CBC AUTOMATED: CPT | Performed by: HOSPITALIST

## 2024-07-31 RX ADMIN — SODIUM CHLORIDE, SODIUM LACTATE, POTASSIUM CHLORIDE, AND CALCIUM CHLORIDE 125 ML/HR: .6; .31; .03; .02 INJECTION, SOLUTION INTRAVENOUS at 08:43

## 2024-07-31 RX ADMIN — CEFTRIAXONE 2000 MG: 2 INJECTION, SOLUTION INTRAVENOUS at 09:24

## 2024-07-31 RX ADMIN — ACETAMINOPHEN 650 MG: 325 TABLET, FILM COATED ORAL at 15:32

## 2024-07-31 RX ADMIN — ACETAMINOPHEN 650 MG: 325 TABLET, FILM COATED ORAL at 08:38

## 2024-07-31 RX ADMIN — SODIUM CHLORIDE, SODIUM LACTATE, POTASSIUM CHLORIDE, AND CALCIUM CHLORIDE 125 ML/HR: .6; .31; .03; .02 INJECTION, SOLUTION INTRAVENOUS at 02:28

## 2024-07-31 RX ADMIN — SODIUM CHLORIDE, SODIUM LACTATE, POTASSIUM CHLORIDE, AND CALCIUM CHLORIDE 125 ML/HR: .6; .31; .03; .02 INJECTION, SOLUTION INTRAVENOUS at 18:09

## 2024-07-31 RX ADMIN — ENOXAPARIN SODIUM 40 MG: 40 INJECTION SUBCUTANEOUS at 08:38

## 2024-07-31 RX ADMIN — ACETAMINOPHEN 650 MG: 325 TABLET, FILM COATED ORAL at 21:48

## 2024-07-31 NOTE — ASSESSMENT & PLAN NOTE
Presents with 2 days left flank pain, N/V  CT A/P shows evidence of possible recently passed kidney stone, she also has additional nonobstructing 8 mm left renal calculus.  Follow up KUB was reviewed by IR, and appears to have b/l excretion  Symptoms improving, near resolved  Appreciate Urology input  Repeat Kidney US shows resolved left hydronephrosis  Will need Urology follow up given kidney stone

## 2024-07-31 NOTE — CASE MANAGEMENT
Case Management Discharge Planning Note    Patient name Leigh Torres  Location /419-01 MRN [id number]  : 1980 Date 2024       Current Admission Date: 2024  Current Admission Diagnosis:Sepsis without acute organ dysfunction (HCC)   Patient Active Problem List    Diagnosis Date Noted Date Diagnosed    Anxiety 2024     Sepsis without acute organ dysfunction (HCC) 2024     Pyelonephritis 2024     Kidney stone 2024     Pulmonary nodule 2024     Hypokalemia 2024       LOS (days): 1  Geometric Mean LOS (GMLOS) (days):   Days to GMLOS:     OBJECTIVE:  Risk of Unplanned Readmission Score: 7.9         Current admission status: Inpatient   Preferred Pharmacy:   [id number] - [place], [place] - [address]  [address]  [place] [address]  Phone: [phone] Fax: [phone]    Primary Care Provider: No primary care provider on file.    Primary Insurance: [employer] [place] [employer] [facility]  Secondary Insurance:     DISCHARGE DETAILS:  Chart review done. Pt admitted with sepsis likely source pyelonephritis. No Cm needs identified at this time. Plans are home on dc with OP follow up when medically ready. Cm will continue to follow in the event any dc needs arise.

## 2024-07-31 NOTE — ASSESSMENT & PLAN NOTE
Suspected, due to possible recently passed kidney stone  CT A/P: there is nonspecific urothelial thickening and hyperemia at the left ureterovesicular junction. While this potentially represents a recently passed calculus, the focal urothelial thickening could alternatively represent inflammation or less likely focal malignancy. The renal parenchymal heterogeneity is concerning for potential superimposed pyelonephritis.  UA with only occasional bacteria, WBC and RBC present possibly in setting recent passed stone  Urine culture pending  Trend CBC, VS  IVF  IV Ceftriaxone

## 2024-07-31 NOTE — CONSULTS
"Consultation - Urology   Leigh Torres 44 y.o. female MRN: [id number]  Unit/Bed#: 419-01 Encounter: [id number]      Assessment & Plan      Assessment/Plan :  43yo F presenting with left flank pain, urology consulted due to findings of :    Pyelonephritis   Hydronephrosis, resolved on US  - continue IV abx   - IVF hydration   - 7/30 CTAP \"Moderate left renal pelvocaliectasis, with heterogeneous enhancement throughout the left renal parenchyma. While there is no discrete obstructing calculus, there is nonspecific urothelial thickening and hyperemia at the left ureterovesicular junction. While this potentially represents a recently passed calculus, the focal urothelial thickening could alternatively represent inflammation or less likely focal malignancy. The renal parenchymal heterogeneity is concerning for potential superimposed pyelonephritis.\"  -7/30 US kidney / bladder \"Resolved left hydronephrosis. \"    Outpatient follow up with urology for findings of urothelial thickening, may require additional follow up imaging   IV abx, fluids  Follow up blood and urine cx and tailor abx accordingly  Monitor AM labs, vitals, trend fever curve    Sepsis, suspected  source  - aeb tachycardia, fever, leukocytosis  - Tmax 101.9 this morning, remainder of VSS on RA  - blood cx 1/2 + G- rods  - urine cx pending  - leukocytosis improving, WBC 13.2 (16.6)    IV abx, fluids  Follow up cultures   Trend AM labs, monitor vitals, trend fever curve   Management per primary team     Nephrolithiasis   - history of kidney stones starting in Oct 2023, all spontaneously passed without need for additional intervention    calculi present in L kidney on imaging , will need outpatient follow up with urology       Discussed case with urology AP on call    History of Present Illness   Attending: Nils Payne Counts, DO  Reason for Consult / Principal Problem: nephrolithiasis, pyelonephritis, hydronephrosis, sepsis from  source  HPI: Leigh " Brian is a 44 y.o. year old female w/ Pmh of anxiety and previous kidney stones who presents with left sided, waxing and waning flank pain.  Patient states pain started Sunday morning and was progressively worsening, prompting her evaluation in the ED.  Pain was associated with nausea and vomiting and poor oral intake due to nausea.  Reports discomfort/burning with urination, frequency, urgency, denies hematuria.  Patient did note that she has a history of kidney stones starting in October 2023 all which have passed without need for intervention.    In ED she was found to be febrile, tachycardic, with leukocytosis and lactic acidosis.  CTAP obtained and significant for renal stones, no obstructing calculus, hydronephrosis, nonspecific urothelial thickening at the UVJ and findings consistent with superimposed pyelonephritis.  US kidney bladder was obtained later that evening showing resolution of left-sided hydronephrosis.     Today, patient reports she is still experiencing fever/chills.  Pain improved.  Tolerating diet without nausea/vomiting.  Still with same urinary symptoms mentioned prior.    Inpatient consult to Urology  Consult performed by: Kae Reyez PA-C  Consult ordered by: Kanwal Sharma PA-C          Review of Systems   Constitutional:  Positive for chills, diaphoresis and fever.   HENT:  Negative for congestion.    Respiratory:  Negative for chest tightness and shortness of breath.    Cardiovascular:  Negative for chest pain.   Gastrointestinal:  Positive for abdominal pain (LUQ) and nausea. Negative for abdominal distention.   Genitourinary:  Positive for dysuria, flank pain, frequency and urgency. Negative for hematuria.   Neurological:  Positive for headaches. Negative for dizziness and light-headedness.   Psychiatric/Behavioral:  Negative for confusion.        Historical Information   History reviewed. No pertinent past medical history.  Past Surgical History:   Procedure Laterality  "Date     SECTION           Social History   Social History     Substance and Sexual Activity   Alcohol Use Never       E-Cigarette/Vaping     E-Cigarette/Vaping Substances     Social History     Tobacco Use   Smoking Status Former    Current packs/day: 0.00    Types: Cigarettes    Quit date:     Years since quittin.5   Smokeless Tobacco Never     Family History: non-contributory    Meds/Allergies   all current active meds have been reviewed  Allergies   Allergen Reactions    Shellfish-Derived Products - Food Allergy Anaphylaxis       Objective   Vitals: Blood pressure 143/88, pulse 92, temperature (!) 101.9 °F (38.8 °C), resp. rate 16, height 5' 2\" (1.575 m), weight 83.7 kg (184 lb 8.4 oz), last menstrual period 2024, SpO2 96%.    I/O last 24 hours:  In: 1500 [P.O.:400; IV Piggyback:1100]  Out: 900 [Urine:900]    Invasive Devices       Peripheral Intravenous Line  Duration             Peripheral IV 24 Right Antecubital <1 day                    Physical Exam  Vitals reviewed.   Constitutional:       General: She is not in acute distress.     Appearance: She is diaphoretic. She is not toxic-appearing.   HENT:      Head: Normocephalic and atraumatic.      Mouth/Throat:      Mouth: Mucous membranes are moist.   Cardiovascular:      Rate and Rhythm: Normal rate.   Pulmonary:      Effort: Pulmonary effort is normal. No respiratory distress.   Abdominal:      General: There is no distension.      Palpations: Abdomen is soft.      Tenderness: There is no abdominal tenderness. There is no right CVA tenderness, left CVA tenderness, guarding or rebound.   Musculoskeletal:      Cervical back: Neck supple.   Skin:     General: Skin is warm.   Neurological:      General: No focal deficit present.      Mental Status: She is alert and oriented to person, place, and time.   Psychiatric:         Mood and Affect: Mood normal.         Behavior: Behavior is cooperative.         Lab Results: I have " "personally reviewed pertinent reports.    CBC:   Lab Results   Component Value Date    WBC 13.28 (H) 07/31/2024    HGB 10.4 (L) 07/31/2024    HCT 32.1 (L) 07/31/2024    MCV 83 07/31/2024     07/31/2024    RBC 3.87 07/31/2024    MCH 26.9 07/31/2024    MCHC 32.4 07/31/2024    RDW 13.5 07/31/2024    MPV 11.7 07/31/2024     CMP:   Lab Results   Component Value Date    SODIUM 136 07/31/2024     07/31/2024    CO2 19 (L) 07/31/2024    BUN 9 07/31/2024    CREATININE 0.95 07/31/2024    CALCIUM 9.2 07/31/2024    AST 9 (L) 07/30/2024    ALT 9 07/30/2024    ALKPHOS 68 07/30/2024    EGFR 73 07/31/2024     Urinalysis:   Lab Results   Component Value Date    COLORU Yellow 07/30/2024    CLARITYU Clear 07/30/2024    SPECGRAV 1.010 07/30/2024    PHUR 5.5 07/30/2024    LEUKOCYTESUR Moderate (A) 07/30/2024    NITRITE Negative 07/30/2024    GLUCOSEU Negative 07/30/2024    KETONESU 40 (2+) (A) 07/30/2024    BILIRUBINUR Negative 07/30/2024    BLOODU Small (A) 07/30/2024     Urine Culture: No results found for: \"URINECX\"  Imaging Studies: I have personally reviewed pertinent reports.      7/30 CTAP \"1.  Moderate left renal pelvocaliectasis, with heterogeneous enhancement throughout the left renal parenchyma. While there is no discrete obstructing calculus, there is nonspecific urothelial thickening and hyperemia at the left ureterovesicular   junction. While this potentially represents a recently passed calculus, the focal urothelial thickening could alternatively represent inflammation or less likely focal malignancy. The renal parenchymal heterogeneity is concerning for potential   superimposed pyelonephritis.  2.  There is an additional nonobstructing 8 mm left renal calculus.  3.  There are 2 small pulmonary nodules within the right lower lobe, the larger of which measures 4 mm. A baseline follow-up chest CT is recommended for further evaluation.\"    7/30 KUB \"Moderate left hydronephrosis and hydroureter. Mild narrowing at " "the left UPJ.\"    7/30US kidney/bladder \"Resolved left hydronephrosis. \"    EKG, Pathology, and Other Studies: I have personally reviewed pertinent reports.    VTE Prophylaxis: Enoxaparin (Lovenox)    Code Status: Level 1 - Full Code  Advance Directive and Living Will:      Power of :    POLST:      Counseling / Coordination of Care  Total floor / unit time spent today 40 minutes. Greater than 50% of total time was spent with the patient and / or family counseling and / or coordination of care.    Kae Reyez, PAC  07/31/24  **Please Note: Portions of the record may have been created using voice recognition software.  Occasional wrong word or \"sound a like\" substitutions may have occurred due to the inherent limitations of voice recognition software.  Read the chart carefully and recognize, using context, where substitutions have occurred.**      "

## 2024-07-31 NOTE — UTILIZATION REVIEW
Initial Clinical Review    Admission: Date/Time/Statement:   Admission Orders (From admission, onward)       Ordered        07/30/24 1743  INPATIENT ADMISSION  Once                          Orders Placed This Encounter   Procedures    INPATIENT ADMISSION     Standing Status:   Standing     Number of Occurrences:   1     Order Specific Question:   Level of Care     Answer:   Med Surg [16]     Order Specific Question:   Estimated length of stay     Answer:   More than 2 Midnights     Order Specific Question:   Certification     Answer:   I certify that inpatient services are medically necessary for this patient for a duration of greater than two midnights. See H&P and MD Progress Notes for additional information about the patient's course of treatment.     ED Arrival Information       Expected   -    Arrival   7/30/2024 11:38    Acuity   Urgent              Means of arrival   Walk-In    Escorted by   Family Member    Service   Hospitalist    Admission type   Emergency              Arrival complaint   Vomitting, Flank Pain, Chills             Chief Complaint   Patient presents with    Flank Pain     Pt c/o left sided flank pain that radiates to the front since Sunday, pt has hx of kidney stones. Pt c/o vomiting and chills since yesterday        Initial Presentation: 44 y.o. female presented to ED from home as inpatient admission for sepsis. PMH of anxiety and Hx of kidney stones who presents with left flank pain.  Patient has been having left flank pain for 48 hours prior to admission, progressively worsening, associated with nausea and vomiting and poor oral intake due to nausea. intermittent discomfort with urinating and hematuria. On exam Mild abdominal tenderness over the left flank   Plan IV Rocephin, IVF, trend blood and urine culture and supportive care     Anticipated Length of Stay/Certification Statement: Patient will be admitted on an inpatient basis with an anticipated length of stay of greater than 2  "midnights secondary to sepsis, pyelo, kidney stone.     Date: 07-31-24   Day 2: T max 101.9 this AM continue IV Rocephin, IVF, Procalcitonin 5.25 - trend Blood cultures 1/2 GNR with PCR positive for E.coli urine cultures pending On exam diaphoretic. Mild flank pain.     Surgery consult:   Assessment/Plan :  45yo F presenting with left flank pain, urology consulted due to findings of :  Pyelonephritis   Hydronephrosis, resolved on US  - continue IV abx   - IVF hydration   - 7/30 CTAP \"Moderate left renal pelvocaliectasis, with heterogeneous enhancement throughout the left renal parenchyma. While there is no discrete obstructing calculus, there is nonspecific urothelial thickening and hyperemia at the left ureterovesicular junction. While this potentially represents a recently passed calculus, the focal urothelial thickening could alternatively represent inflammation or less likely focal malignancy. The renal parenchymal heterogeneity is concerning for potential superimposed pyelonephritis.\"  -7/30 US kidney / bladder \"Resolved left hydronephrosis. \"     Outpatient follow up with urology for findings of urothelial thickening, may require additional follow up imaging   IV abx, fluids  Follow up blood and urine cx and tailor abx accordingly  Monitor AM labs, vitals, trend fever curve           ED Triage Vitals   Temperature Pulse Respirations Blood Pressure SpO2 Pain Score   07/30/24 1205 07/30/24 1200 07/30/24 1200 07/30/24 1200 07/30/24 1200 07/30/24 1200   100.5 °F (38.1 °C) (!) 129 20 125/78 97 % 6     Weight (last 2 days)       Date/Time Weight    07/30/24 18:21:47 83.7 (184.53)    07/30/24 1205 83.2 (183.42)            Vital Signs (last 3 days)       Date/Time Temp Pulse Resp BP MAP (mmHg) SpO2 O2 Device Patient Position - Orthostatic VS Pain    07/31/24 1107 98.1 °F (36.7 °C) 88 -- 104/79 87 98 % -- -- --    07/31/24 1103 -- 85 -- -- -- -- -- -- --    07/31/24 0924 99.3 °F (37.4 °C) -- -- -- -- -- -- -- --    " 07/31/24 0838 -- -- -- -- -- -- -- -- 7    07/31/24 07:25:32 101.9 °F (38.8 °C) 92 16 143/88 106 96 % -- -- --    07/31/24 04:48:56 98.7 °F (37.1 °C) 75 16 123/73 90 99 % None (Room air) Lying --    07/31/24 01:31:45 99.7 °F (37.6 °C) 94 -- -- -- 99 % -- -- --    07/30/24 2259 -- -- -- -- -- -- -- -- Med Not Given for Pain - for MAR use only    07/30/24 22:42:11 101.1 °F (38.4 °C) 96 17 121/78 92 100 % None (Room air) Lying --    07/30/24 19:50:02 98.2 °F (36.8 °C) 96 18 113/79 90 99 % -- -- --    07/30/24 1939 -- -- -- -- -- 99 % None (Room air) -- --    07/30/24 18:21:47 97.9 °F (36.6 °C) 86 20 113/79 90 99 % None (Room air) Sitting No Pain    07/30/24 1748 98.4 °F (36.9 °C) 80 20 115/79 -- 98 % None (Room air) -- --    07/30/24 1532 -- 89 18 112/71 -- 97 % -- -- --    07/30/24 1326 -- -- -- -- -- -- -- -- 5    07/30/24 1230 -- 117 20 137/71 91 97 % None (Room air) Sitting --    07/30/24 1205 100.5 °F (38.1 °C) -- -- -- -- -- -- -- --    07/30/24 1200 -- 129 20 125/78 94 97 % None (Room air) Lying 6              Pertinent Labs/Diagnostic Test Results:   Radiology:  US kidney and bladder   Final Interpretation by Oliver Navarrete MD (07/30 2023)      Resolved left hydronephrosis.            Workstation performed: [id number]         XR abdomen 1 view kub   Final Interpretation by Hector Lundberg MD (07/31 6281)      Moderate left hydronephrosis and hydroureter. Mild narrowing at the left UPJ.               Workstation performed: [id number]         CT abdomen pelvis with contrast   Final Interpretation by Tc Mujica MD (07/30 7863)      1.  Moderate left renal pelvocaliectasis, with heterogeneous enhancement throughout the left renal parenchyma. While there is no discrete obstructing calculus, there is nonspecific urothelial thickening and hyperemia at the left ureterovesicular    junction. While this potentially represents a recently passed calculus, the focal urothelial thickening could  alternatively represent inflammation or less likely focal malignancy. The renal parenchymal heterogeneity is concerning for potential    superimposed pyelonephritis.   2.  There is an additional nonobstructing 8 mm left renal calculus.   3.  There are 2 small pulmonary nodules within the right lower lobe, the larger of which measures 4 mm. A baseline follow-up chest CT is recommended for further evaluation.         Workstation performed: [id number]           Cardiology:  ECG 12 lead   Final Result by Seema Somers DO (07/31 0925)   Sinus tachycardia   Possible Left atrial enlargement   Nonspecific ST and T wave abnormality   Borderline ECG   No previous ECGs available   Confirmed by Seema Somers ([id number]) on 7/31/2024 9:25:30 AM        Results from last 7 days   Lab Units 07/31/24  0454 07/30/24  1213   WBC Thousand/uL 13.28* 16.62*   HEMOGLOBIN g/dL 10.4* 11.7   HEMATOCRIT % 32.1* 36.3   PLATELETS Thousands/uL 191 207         Results from last 7 days   Lab Units 07/31/24  0454 07/30/24  1214   SODIUM mmol/L 136 133*   POTASSIUM mmol/L 4.0 3.2*   CHLORIDE mmol/L 107 103   CO2 mmol/L 19* 15*   ANION GAP mmol/L 10 15*   BUN mg/dL 9 10   CREATININE mg/dL 0.95 1.07   EGFR ml/min/1.73sq m 73 63   CALCIUM mg/dL 9.2 9.8     Results from last 7 days   Lab Units 07/30/24  1214   AST U/L 9*   ALT U/L 9   ALK PHOS U/L 68   TOTAL PROTEIN g/dL 7.7   ALBUMIN g/dL 4.1   TOTAL BILIRUBIN mg/dL 1.11*         Results from last 7 days   Lab Units 07/31/24  0454 07/30/24  1214   GLUCOSE RANDOM mg/dL 111 192*           Results from last 7 days   Lab Units 07/30/24  1228   PROTIME seconds 16.1*   INR  1.31*   PTT seconds 30         Results from last 7 days   Lab Units 07/30/24  1228   PROCALCITONIN ng/ml 5.25*     Results from last 7 days   Lab Units 07/30/24  1501 07/30/24  1221   LACTIC ACID mmol/L 1.1 2.5*     Results from last 7 days   Lab Units 07/30/24  1348   CLARITY UA  Clear   COLOR UA  Yellow   SPEC GRAV UA  1.010   PH UA  5.5    GLUCOSE UA mg/dl Negative   KETONES UA mg/dl 40 (2+)*   BLOOD UA  Small*   PROTEIN UA mg/dl 30 (1+)*   NITRITE UA  Negative   BILIRUBIN UA  Negative   UROBILINOGEN UA (BE) mg/dl <2.0   LEUKOCYTES UA  Moderate*   WBC UA /hpf 4-10*   RBC UA /hpf 4-10*   BACTERIA UA /hpf Occasional   EPITHELIAL CELLS WET PREP /hpf Occasional         Results from last 7 days   Lab Units 07/30/24  1228   GRAM STAIN RESULT  Gram positive cocci in clusters*  Gram negative rods*                   ED Treatment-Medication Administration from 07/30/2024 1138 to 07/30/2024 1757         Date/Time Order Dose Route Action     07/30/2024 1222 sodium chloride 0.9 % bolus 1,000 mL 1,000 mL Intravenous New Bag     07/30/2024 1222 ondansetron (ZOFRAN) injection 4 mg 4 mg Intravenous Given     07/30/2024 1238 acetaminophen (Ofirmev) injection 1,000 mg 1,000 mg Intravenous New Bag     07/30/2024 1326 ketorolac (TORADOL) injection 15 mg 15 mg Intravenous Given     07/30/2024 1324 iohexol (OMNIPAQUE) 350 MG/ML injection (SINGLE-DOSE) 100 mL 100 mL Intravenous Given     07/30/2024 1340 ondansetron (ZOFRAN) injection 4 mg 4 mg Intravenous Given     07/30/2024 1415 sodium chloride 0.9 % bolus 1,000 mL 1,000 mL Intravenous New Bag     07/30/2024 1414 cefTRIAXone (ROCEPHIN) IVPB (premix in dextrose) 1,000 mg 50 mL 1,000 mg Intravenous New Bag            History reviewed. No pertinent past medical history.  Present on Admission:   Anxiety   Sepsis without acute organ dysfunction (HCC)   Pyelonephritis   Kidney stone   Pulmonary nodule   Hypokalemia   Bacteremia due to Gram-negative bacteria      Admitting Diagnosis: Hypokalemia [E87.6]  Flank pain [R10.9]  Renal calculus, left [N20.0]  Pulmonary nodule, right [R91.1]  Sepsis (HCC) [A41.9]  Pyelonephritis of left kidney [N12]  Age/Sex: 44 y.o. female  Admission Orders:  Scheduled Medications:  cefTRIAXone, 2,000 mg, Intravenous, Q24H  enoxaparin, 40 mg, Subcutaneous, Daily      Continuous IV  Infusions:  lactated ringers, 125 mL/hr, Intravenous, Continuous      PRN Meds:  acetaminophen, 650 mg, Oral, Q6H PRN  hydrOXYzine HCL, 25 mg, Oral, Q6H PRN  ondansetron, 4 mg, Intravenous, Q8H PRN        IP CONSULT TO UROLOGY    Network Utilization Review Department  ATTENTION: Please call with any questions or concerns to [phone] and carefully listen to the prompts so that you are directed to the right person. All voicemails are confidential.   For Discharge needs, contact Care Management DC Support Team at [phone] opt. 2  Send all requests for admission clinical reviews, approved or denied determinations and any other requests to dedicated fax number below belonging to the campus where the patient is receiving treatment. List of dedicated fax numbers for the Facilities:  FACILITY NAME UR FAX NUMBER   ADMISSION DENIALS (Administrative/Medical Necessity) [phone]   DISCHARGE SUPPORT TEAM (NETWORK) [phone]   PARENT CHILD HEALTH (Maternity/NICU/Pediatrics) [phone]   [facility] [phone]   [facility] [phone]   [facility] [phone]   [facility] [phone]   [facility] [phone]   [facility] [phone]   [facility] [phone]   [facility] [phone]   [facility] [phone]   [facility] [phone]   [facility] [phone]   [facility] [phone]

## 2024-07-31 NOTE — PROGRESS NOTES
[facility]  Progress Note  Name: Leigh Torres I  MRN: [id number]  Unit/Bed#: 419-01 I Date of Admission: 7/30/2024   Date of Service: 7/31/2024 I Hospital Day: 1    Assessment & Plan   * Sepsis without acute organ dysfunction (HCC)  Assessment & Plan  Tachycardic, leukocytosis, lactic acid 2.5 on admission. Patient did receive 30 cc/kg in ED with 2L IVF, IV Tylenol, and IV Antibiotics  Suspected source pyelonephritis, and GNR bacteremia   Procalcitonin 5.25 - trend  Blood cultures 1/2 GNR with PCR positive for E.coli  IV Ceftriaxone  Await culture sensitivities, anticipate 2 weeks antibiotics with transition to PO on discharge      Pyelonephritis  Assessment & Plan  Suspected, due to possible recently passed kidney stone  CT A/P: there is nonspecific urothelial thickening and hyperemia at the left ureterovesicular junction. While this potentially represents a recently passed calculus, the focal urothelial thickening could alternatively represent inflammation or less likely focal malignancy. The renal parenchymal heterogeneity is concerning for potential superimposed pyelonephritis.  UA with only occasional bacteria, WBC and RBC present possibly in setting recent passed stone  Urine culture pending  Trend CBC, VS  IVF  IV Ceftriaxone    Bacteremia due to Gram-negative bacteria  Assessment & Plan  See under sepsis    Kidney stone  Assessment & Plan  Presents with 2 days left flank pain, N/V  CT A/P shows evidence of possible recently passed kidney stone, she also has additional nonobstructing 8 mm left renal calculus.  Follow up KUB was reviewed by IR, and appears to have b/l excretion  Symptoms improving, near resolved  Appreciate Urology input  Repeat Kidney US shows resolved left hydronephrosis  Will need Urology follow up given kidney stone      Hypokalemia  Assessment & Plan  Suspect due to N/V  Repleted  resolved    Pulmonary nodule  Assessment & Plan  Incidental findings  There are 2  small pulmonary nodules within the right lower lobe, the larger of which measures 4 mm.   Follow up Chest CT as outpatient    Anxiety  Assessment & Plan  Uses medical marijuana  Will utilize PRN atarax               VTE Pharmacologic Prophylaxis: VTE Score: 3 Moderate Risk (Score 3-4) - Pharmacological DVT Prophylaxis Ordered: enoxaparin (Lovenox).    Mobility:   Basic Mobility Inpatient Raw Score: 24  JH-HLM Goal: 8: Walk 250 feet or more  JH-HLM Achieved: 7: Walk 25 feet or more  JH-HLM Goal NOT achieved. Continue with multidisciplinary rounding and encourage appropriate mobility to improve upon JH-HLM goals.    Patient Centered Rounds: I performed bedside rounds with nursing staff today.   Discussions with Specialists or Other Care Team Provider: none    Education and Discussions with Family / Patient: Patient declined call to .     Total Time Spent on Date of Encounter in care of patient: 32 mins. This time was spent on one or more of the following: performing physical exam; counseling and coordination of care; obtaining or reviewing history; documenting in the medical record; reviewing/ordering tests, medications or procedures; communicating with other healthcare professionals and discussing with patient's family/caregivers.    Current Length of Stay: 1 day(s)  Current Patient Status: Inpatient   Certification Statement: The patient will continue to require additional inpatient hospital stay due to sepsis, bacteremia  Discharge Plan: Anticipate discharge tomorrow to home.    Code Status: Level 1 - Full Code    Subjective:   Patient feels improved, flank pain mostly resolved. She does feel a bit fatigued. Feels she has some difficulty taking a deep breath and does state she had CoVID 2 weeks ago. Not SoB or CP.     Objective:     Vitals:   Temp (24hrs), Av.6 °F (37.6 °C), Min:97.9 °F (36.6 °C), Max:101.9 °F (38.8 °C)    Temp:  [97.9 °F (36.6 °C)-101.9 °F (38.8 °C)] 101.9 °F (38.8 °C)  HR:   [] 92  Resp:  [16-20] 16  BP: (112-143)/(71-88) 143/88  SpO2:  [96 %-100 %] 96 %  Body mass index is 33.75 kg/m².     Input and Output Summary (last 24 hours):     Intake/Output Summary (Last 24 hours) at 7/31/2024 1053  Last data filed at 7/31/2024 0757  Gross per 24 hour   Intake 1500 ml   Output 900 ml   Net 600 ml       Physical Exam:   Physical Exam  Vitals and nursing note reviewed.   Constitutional:       General: She is not in acute distress.     Appearance: She is well-developed. She is diaphoretic.   HENT:      Head: Normocephalic and atraumatic.   Eyes:      Conjunctiva/sclera: Conjunctivae normal.   Cardiovascular:      Rate and Rhythm: Normal rate and regular rhythm.      Heart sounds: No murmur heard.  Pulmonary:      Effort: Pulmonary effort is normal. No respiratory distress.      Breath sounds: Normal breath sounds.   Abdominal:      General: There is no distension.      Palpations: Abdomen is soft.      Tenderness: There is no abdominal tenderness.   Musculoskeletal:         General: No swelling.      Cervical back: Neck supple.   Skin:     General: Skin is warm.      Capillary Refill: Capillary refill takes less than 2 seconds.   Neurological:      Mental Status: She is alert.   Psychiatric:         Mood and Affect: Mood normal.          Additional Data:     Labs:  Results from last 7 days   Lab Units 07/31/24  0454 07/30/24  1213   WBC Thousand/uL 13.28* 16.62*   HEMOGLOBIN g/dL 10.4* 11.7   HEMATOCRIT % 32.1* 36.3   PLATELETS Thousands/uL 191 207   LYMPHO PCT %  --  3*   MONO PCT %  --  1*   EOS PCT %  --  0     Results from last 7 days   Lab Units 07/31/24  0454 07/30/24  1214   SODIUM mmol/L 136 133*   POTASSIUM mmol/L 4.0 3.2*   CHLORIDE mmol/L 107 103   CO2 mmol/L 19* 15*   BUN mg/dL 9 10   CREATININE mg/dL 0.95 1.07   ANION GAP mmol/L 10 15*   CALCIUM mg/dL 9.2 9.8   ALBUMIN g/dL  --  4.1   TOTAL BILIRUBIN mg/dL  --  1.11*   ALK PHOS U/L  --  68   ALT U/L  --  9   AST U/L  --  9*    GLUCOSE RANDOM mg/dL 111 192*     Results from last 7 days   Lab Units 07/30/24  1228   INR  1.31*             Results from last 7 days   Lab Units 07/30/24  1501 07/30/24  1228 07/30/24  1221   LACTIC ACID mmol/L 1.1  --  2.5*   PROCALCITONIN ng/ml  --  5.25*  --        Lines/Drains:  Invasive Devices       Peripheral Intravenous Line  Duration             Peripheral IV 07/30/24 Right Antecubital <1 day                          Imaging: Reviewed radiology reports from this admission including: ultrasound(s)    Recent Cultures (last 7 days):   Results from last 7 days   Lab Units 07/30/24  1228   BLOOD CULTURE  Received in Microbiology Lab. Culture in Progress.   GRAM STAIN RESULT  Gram negative rods*       Last 24 Hours Medication List:   Current Facility-Administered Medications   Medication Dose Route Frequency Provider Last Rate    acetaminophen  650 mg Oral Q6H PRN Austyn Jatin Chawla MD      cefTRIAXone  2,000 mg Intravenous Q24H Nils Elmer Counts, DO Stopped (07/31/24 1010)    enoxaparin  40 mg Subcutaneous Daily Nils Elmer Counts, DO      hydrOXYzine HCL  25 mg Oral Q6H PRN Nils Elmer Counts, DO      lactated ringers  125 mL/hr Intravenous Continuous Nils Elmer Counts,  mL/hr (07/31/24 1010)    ondansetron  4 mg Intravenous Q8H PRN Nils Elmer Counts, DO          Today, Patient Was Seen By: Nils Payne Counts, DO    **Please Note: This note may have been constructed using a voice recognition system.**

## 2024-07-31 NOTE — ASSESSMENT & PLAN NOTE
Tachycardic, leukocytosis, lactic acid 2.5 on admission. Patient did receive 30 cc/kg in ED with 2L IVF, IV Tylenol, and IV Antibiotics  Suspected source pyelonephritis, and GNR bacteremia   Procalcitonin 5.25 - trend  Blood cultures 1/2 GNR with PCR positive for E.coli  IV Ceftriaxone  Await culture sensitivities, anticipate 2 weeks antibiotics with transition to PO on discharge

## 2024-07-31 NOTE — PLAN OF CARE
Problem: PAIN - ADULT  Goal: Verbalizes/displays adequate comfort level or baseline comfort level  Description: Interventions:  - Encourage patient to monitor pain and request assistance  - Assess pain using appropriate pain scale  - Administer analgesics based on type and severity of pain and evaluate response  - Implement non-pharmacological measures as appropriate and evaluate response  - Consider cultural and social influences on pain and pain management  - Notify physician/advanced practitioner if interventions unsuccessful or patient reports new pain  Outcome: Progressing     Problem: DISCHARGE PLANNING  Goal: Discharge to home or other facility with appropriate resources  Description: INTERVENTIONS:  - Identify barriers to discharge w/patient and caregiver  - Arrange for needed discharge resources and transportation as appropriate  - Identify discharge learning needs (meds, wound care, etc.)  - Arrange for interpretive services to assist at discharge as needed  - Refer to Case Management Department for coordinating discharge planning if the patient needs post-hospital services based on physician/advanced practitioner order or complex needs related to functional status, cognitive ability, or social support system  Outcome: Progressing     Problem: Knowledge Deficit  Goal: Patient/family/caregiver demonstrates understanding of disease process, treatment plan, medications, and discharge instructions  Description: Complete learning assessment and assess knowledge base.  Interventions:  - Provide teaching at level of understanding  - Provide teaching via preferred learning methods  Outcome: Progressing     Problem: RESPIRATORY - ADULT  Goal: Achieves optimal ventilation and oxygenation  Description: INTERVENTIONS:  - Assess for changes in respiratory status  - Assess for changes in mentation and behavior  - Position to facilitate oxygenation and minimize respiratory effort  - Oxygen administered by appropriate  delivery if ordered  - Initiate smoking cessation education as indicated  - Encourage broncho-pulmonary hygiene including cough, deep breathe, Incentive Spirometry  - Assess the need for suctioning and aspirate as needed  - Assess and instruct to report SOB or any respiratory difficulty  - Respiratory Therapy support as indicated  Outcome: Progressing     Problem: METABOLIC, FLUID AND ELECTROLYTES - ADULT  Goal: Electrolytes maintained within normal limits  Description: INTERVENTIONS:  - Monitor labs and assess patient for signs and symptoms of electrolyte imbalances  - Administer electrolyte replacement as ordered  - Monitor response to electrolyte replacements, including repeat lab results as appropriate  - Instruct patient on fluid and nutrition as appropriate  Outcome: Progressing  Goal: Fluid balance maintained  Description: INTERVENTIONS:  - Monitor labs   - Monitor I/O and WT  - Instruct patient on fluid and nutrition as appropriate  - Assess for signs & symptoms of volume excess or deficit  Outcome: Progressing     Problem: MUSCULOSKELETAL - ADULT  Goal: Maintain or return mobility to safest level of function  Description: INTERVENTIONS:  - Assess patient's ability to carry out ADLs; assess patient's baseline for ADL function and identify physical deficits which impact ability to perform ADLs (bathing, care of mouth/teeth, toileting, grooming, dressing, etc.)  - Assess/evaluate cause of self-care deficits   - Assess range of motion  - Assess patient's mobility  - Assess patient's need for assistive devices and provide as appropriate  - Encourage maximum independence but intervene and supervise when necessary  - Involve family in performance of ADLs  - Assess for home care needs following discharge   - Consider OT consult to assist with ADL evaluation and planning for discharge  - Provide patient education as appropriate  Outcome: Progressing  Goal: Maintain proper alignment of affected body  part  Description: INTERVENTIONS:  - Support, maintain and protect limb and body alignment  - Provide patient/ family with appropriate education  Outcome: Progressing

## 2024-08-01 ENCOUNTER — APPOINTMENT (INPATIENT)
Dept: CT IMAGING | Facility: HOSPITAL | Age: 44
DRG: 720 | End: 2024-08-01
Payer: COMMERCIAL

## 2024-08-01 LAB
ANION GAP SERPL CALCULATED.3IONS-SCNC: 10 MMOL/L (ref 4–13)
BUN SERPL-MCNC: 6 MG/DL (ref 5–25)
CALCIUM SERPL-MCNC: 8.9 MG/DL (ref 8.4–10.2)
CHLORIDE SERPL-SCNC: 106 MMOL/L (ref 96–108)
CO2 SERPL-SCNC: 20 MMOL/L (ref 21–32)
CREAT SERPL-MCNC: 0.89 MG/DL (ref 0.6–1.3)
ERYTHROCYTE [DISTWIDTH] IN BLOOD BY AUTOMATED COUNT: 13.6 % (ref 11.6–15.1)
GFR SERPL CREATININE-BSD FRML MDRD: 79 ML/MIN/1.73SQ M
GLUCOSE SERPL-MCNC: 102 MG/DL (ref 65–140)
HCT VFR BLD AUTO: 30.2 % (ref 34.8–46.1)
HGB BLD-MCNC: 9.5 G/DL (ref 11.5–15.4)
MCH RBC QN AUTO: 25.9 PG (ref 26.8–34.3)
MCHC RBC AUTO-ENTMCNC: 31.5 G/DL (ref 31.4–37.4)
MCV RBC AUTO: 82 FL (ref 82–98)
PLATELET # BLD AUTO: 208 THOUSANDS/UL (ref 149–390)
PMV BLD AUTO: 11.2 FL (ref 8.9–12.7)
POTASSIUM SERPL-SCNC: 3.3 MMOL/L (ref 3.5–5.3)
PROCALCITONIN SERPL-MCNC: 4.46 NG/ML
RBC # BLD AUTO: 3.67 MILLION/UL (ref 3.81–5.12)
SODIUM SERPL-SCNC: 136 MMOL/L (ref 135–147)
WBC # BLD AUTO: 10.46 THOUSAND/UL (ref 4.31–10.16)

## 2024-08-01 PROCEDURE — 84145 PROCALCITONIN (PCT): CPT | Performed by: HOSPITALIST

## 2024-08-01 PROCEDURE — 74176 CT ABD & PELVIS W/O CONTRAST: CPT

## 2024-08-01 PROCEDURE — 99233 SBSQ HOSP IP/OBS HIGH 50: CPT | Performed by: HOSPITALIST

## 2024-08-01 PROCEDURE — 80048 BASIC METABOLIC PNL TOTAL CA: CPT | Performed by: HOSPITALIST

## 2024-08-01 PROCEDURE — 85027 COMPLETE CBC AUTOMATED: CPT | Performed by: HOSPITALIST

## 2024-08-01 PROCEDURE — 99233 SBSQ HOSP IP/OBS HIGH 50: CPT | Performed by: PHYSICIAN ASSISTANT

## 2024-08-01 RX ORDER — POTASSIUM CHLORIDE 20 MEQ/1
40 TABLET, EXTENDED RELEASE ORAL ONCE
Status: COMPLETED | OUTPATIENT
Start: 2024-08-01 | End: 2024-08-01

## 2024-08-01 RX ORDER — KETOROLAC TROMETHAMINE 30 MG/ML
15 INJECTION, SOLUTION INTRAMUSCULAR; INTRAVENOUS EVERY 6 HOURS PRN
Status: DISPENSED | OUTPATIENT
Start: 2024-08-01 | End: 2024-08-01

## 2024-08-01 RX ORDER — ONDANSETRON 2 MG/ML
4 INJECTION INTRAMUSCULAR; INTRAVENOUS EVERY 6 HOURS PRN
Status: DISCONTINUED | OUTPATIENT
Start: 2024-08-01 | End: 2024-08-02 | Stop reason: HOSPADM

## 2024-08-01 RX ORDER — KETOROLAC TROMETHAMINE 30 MG/ML
15 INJECTION, SOLUTION INTRAMUSCULAR; INTRAVENOUS ONCE
Status: COMPLETED | OUTPATIENT
Start: 2024-08-01 | End: 2024-08-01

## 2024-08-01 RX ADMIN — CEFTRIAXONE 2000 MG: 2 INJECTION, SOLUTION INTRAVENOUS at 09:06

## 2024-08-01 RX ADMIN — SODIUM CHLORIDE, SODIUM LACTATE, POTASSIUM CHLORIDE, AND CALCIUM CHLORIDE 125 ML/HR: .6; .31; .03; .02 INJECTION, SOLUTION INTRAVENOUS at 02:23

## 2024-08-01 RX ADMIN — ONDANSETRON 4 MG: 2 INJECTION INTRAMUSCULAR; INTRAVENOUS at 10:19

## 2024-08-01 RX ADMIN — KETOROLAC TROMETHAMINE 15 MG: 30 INJECTION, SOLUTION INTRAMUSCULAR at 04:04

## 2024-08-01 RX ADMIN — POTASSIUM CHLORIDE 40 MEQ: 1500 TABLET, EXTENDED RELEASE ORAL at 10:21

## 2024-08-01 RX ADMIN — ONDANSETRON 4 MG: 2 INJECTION INTRAMUSCULAR; INTRAVENOUS at 04:03

## 2024-08-01 RX ADMIN — KETOROLAC TROMETHAMINE 15 MG: 30 INJECTION, SOLUTION INTRAMUSCULAR at 11:23

## 2024-08-01 RX ADMIN — ACETAMINOPHEN 650 MG: 325 TABLET, FILM COATED ORAL at 18:12

## 2024-08-01 RX ADMIN — ENOXAPARIN SODIUM 40 MG: 40 INJECTION SUBCUTANEOUS at 09:06

## 2024-08-01 NOTE — QUICK NOTE
Notified by primary RN that patient is complaining of increased left flank pain with increased nausea. Patient seen at bedside, she is awake and alert complains of left flank pain. Patient states that pain is very similar to pain she had with previous kidney stone. Patient also complains of nausea with one episode of vomiting. /95, HR 86, RR 20, SPO2 98% on room air, Temp 98.1.       Will give Toradol 15 mg IV  Continue PRN Zofran  Continue IV fluids   Continue PRN Toradol; patient states that she does not want any narcotic medication  Am labs pending  Will order Urology consult given new onset of pain  Continue to monitor

## 2024-08-01 NOTE — ASSESSMENT & PLAN NOTE
Suspected, due to possible recently passed kidney stone  CT A/P: there is nonspecific urothelial thickening and hyperemia at the left ureterovesicular junction. While this potentially represents a recently passed calculus, the focal urothelial thickening could alternatively represent inflammation or less likely focal malignancy. The renal parenchymal heterogeneity is concerning for potential superimposed pyelonephritis.  UA with only occasional bacteria, WBC and RBC present possibly in setting recent passed stone  Urine culture GNR  Trend CBC, VS  IVF  IV Ceftriaxone

## 2024-08-01 NOTE — PROGRESS NOTES
"Progress Note - Urology  Leigh Torres 44 y.o. female MRN: [id number]  Unit/Bed#: 419-01 Encounter: [id number]    Assessment/Plan :  43yo F presenting with left flank pain, urology consulted due to findings of :     Pyelonephritis   Hydronephrosis, resolved on US  - Tmax 101.6, remainder of VSS   - repeat episode of L flank pain overnight, pt states pain felt like another stone passing    In setting of recurrent pain and fever, recommend CT AP for further evaluation   Outpatient follow up with urology for findings of urothelial thickening, may require additional follow up imaging   IV abx, fluids  Follow up blood and urine cx and tailor abx accordingly  Monitor AM labs, vitals, trend fever curve    Nephrolithiasis   - history of kidney stones starting in Oct 2023, all spontaneously passed without need for additional intervention     calculi present in L kidney on imaging , will need outpatient follow up with urology   Strain all urine    Sepsis, suspected  source  - aeb tachycardia, fever, leukocytosis  - Tmax 101.6 late last evening, no recorded fever since,  remainder of VSS on RA  - blood cx  #1 + G- rods, e coli detected and #2 + G+ cocci in clusters, staph detected  - urine cx pending  - leukocytosis improving, WBC 13.2 (16.6)     IV abx, fluids  Follow up cultures   Trend AM labs, monitor vitals, trend fever curve   Management per primary team       Subjective/Objective     Subjective: Episode of L flank pain overnight around 2-3am. Patient was given IV toradol and zofran with some relief. Pain has since resolved. Febrile late last evening with diaphoresis. No fever since before midnight per chart review and pt report. Has an appetite this morning and is looking forward to trying to eat some food from her breakfast tray. +dysuria, small amount of hematuria when wiping this morning.     Objective:   Blood pressure 165/95, pulse 86, temperature 99 °F (37.2 °C), resp. rate 16, height 5' 2\" (1.575 m), weight " 83.7 kg (184 lb 8.4 oz), last menstrual period 07/23/2024, SpO2 97%.,Body mass index is 33.75 kg/m².      Intake/Output Summary (Last 24 hours) at 8/1/2024 0739  Last data filed at 7/31/2024 1803  Gross per 24 hour   Intake 940 ml   Output 800 ml   Net 140 ml       Invasive Devices       Peripheral Intravenous Line  Duration             Peripheral IV 08/01/24 Dorsal (posterior);Right Hand <1 day                    Physical Exam  Constitutional:       General: She is not in acute distress.     Appearance: She is diaphoretic. She is not ill-appearing or toxic-appearing.   HENT:      Head: Normocephalic and atraumatic.   Cardiovascular:      Rate and Rhythm: Normal rate.   Pulmonary:      Effort: Pulmonary effort is normal. No respiratory distress.   Abdominal:      General: There is no distension.      Palpations: Abdomen is soft.      Tenderness: There is no abdominal tenderness. There is left CVA tenderness. There is no right CVA tenderness, guarding or rebound.   Skin:     General: Skin is warm.   Neurological:      General: No focal deficit present.      Mental Status: She is alert and oriented to person, place, and time.   Psychiatric:         Mood and Affect: Mood normal.         Behavior: Behavior normal.          Lab, Imaging and other studies:I have personally reviewed pertinent lab results.  , CBC:   Lab Results   Component Value Date    WBC 10.46 (H) 08/01/2024    HGB 9.5 (L) 08/01/2024    HCT 30.2 (L) 08/01/2024    MCV 82 08/01/2024     08/01/2024    RBC 3.67 (L) 08/01/2024    MCH 25.9 (L) 08/01/2024    MCHC 31.5 08/01/2024    RDW 13.6 08/01/2024    MPV 11.2 08/01/2024   , CMP:   Lab Results   Component Value Date    SODIUM 136 08/01/2024    K 3.3 (L) 08/01/2024     08/01/2024    CO2 20 (L) 08/01/2024    BUN 6 08/01/2024    CREATININE 0.89 08/01/2024    CALCIUM 8.9 08/01/2024    EGFR 79 08/01/2024       XR abdomen 1 view kub    Result Date: 7/31/2024  Impression: Moderate left hydronephrosis  "and hydroureter. Mild narrowing at the left UPJ. Workstation performed: [id number]     US kidney and bladder    Result Date: 7/30/2024  Impression: Resolved left hydronephrosis. Workstation performed: [id number]     CT abdomen pelvis with contrast    Result Date: 7/30/2024  Impression: 1.  Moderate left renal pelvocaliectasis, with heterogeneous enhancement throughout the left renal parenchyma. While there is no discrete obstructing calculus, there is nonspecific urothelial thickening and hyperemia at the left ureterovesicular junction. While this potentially represents a recently passed calculus, the focal urothelial thickening could alternatively represent inflammation or less likely focal malignancy. The renal parenchymal heterogeneity is concerning for potential superimposed pyelonephritis. 2.  There is an additional nonobstructing 8 mm left renal calculus. 3.  There are 2 small pulmonary nodules within the right lower lobe, the larger of which measures 4 mm. A baseline follow-up chest CT is recommended for further evaluation. Workstation performed: [id number]       VTE PPX:  VTE Pharmacologic Prophylaxis: Heparin  VTE Mechanical Prophylaxis: SCDs, ambulation as able     Kae Reyez PA-C  8/1/2024     7:39 AM   **Please Note: Portions of the record may have been created using voice recognition software.  Occasional wrong word or \"sound a like\" substitutions may have occurred due to the inherent limitations of voice recognition software.  Read the chart carefully and recognize, using context, where substitutions have occurred.**    "

## 2024-08-01 NOTE — QUICK NOTE
I personally reviewed Leigh's repeat CT scan- left hydroureteronephrosis is resolved. There is no ureteral stone. There is a stable lower pole renal stone. There is ureteral and perinephric stranding consistent with pyelonephritis. Plan: continue pain management and antibiotics for symptomatic pyelonephritis. No operative intervention indicated (no PCN, no stent).

## 2024-08-01 NOTE — ASSESSMENT & PLAN NOTE
Presents with 2 days left flank pain, N/V. CT A/P shows evidence of possible recently passed kidney stone, she also has additional nonobstructing 8 mm left renal calculus. Follow up KUB was reviewed by IR, and appears to have b/l excretion  Symptoms had improved  Repeat Kidney US 7/31 showed resolved left hydronephrosis  Overnight 7/31 worsened left flank pain - now improved  Appreciate Urology input  Repeat CT renal stone pending  Will need Urology follow up given kidney stone

## 2024-08-01 NOTE — ASSESSMENT & PLAN NOTE
Tachycardic, leukocytosis, lactic acid 2.5 on admission. Patient did receive 30 cc/kg in ED with 2L IVF, IV Tylenol, and IV Antibiotics  Suspected source pyelonephritis, and GNR bacteremia   Procalcitonin 5.25 - trend improving  Blood cultures 1/2 GNR with PCR positive for E.coli, and 1/2 bottled GPC (PCR CoNS) - suspect contaminate  IV Ceftriaxone  Await culture sensitivities, anticipate 2 weeks antibiotics with transition to PO on discharge

## 2024-08-01 NOTE — PROGRESS NOTES
[facility]  Progress Note  Name: Leigh Torres I  MRN: [id number]  Unit/Bed#: 419-01 I Date of Admission: 7/30/2024   Date of Service: 8/1/2024 I Hospital Day: 2    Assessment & Plan   * Sepsis without acute organ dysfunction (HCC)  Assessment & Plan  Tachycardic, leukocytosis, lactic acid 2.5 on admission. Patient did receive 30 cc/kg in ED with 2L IVF, IV Tylenol, and IV Antibiotics  Suspected source pyelonephritis, and GNR bacteremia   Procalcitonin 5.25 - trend improving  Blood cultures 1/2 GNR with PCR positive for E.coli, and 1/2 bottled GPC (PCR CoNS) - suspect contaminate  IV Ceftriaxone  Await culture sensitivities, anticipate 2 weeks antibiotics with transition to PO on discharge      Pyelonephritis  Assessment & Plan  Suspected, due to possible recently passed kidney stone  CT A/P: there is nonspecific urothelial thickening and hyperemia at the left ureterovesicular junction. While this potentially represents a recently passed calculus, the focal urothelial thickening could alternatively represent inflammation or less likely focal malignancy. The renal parenchymal heterogeneity is concerning for potential superimposed pyelonephritis.  UA with only occasional bacteria, WBC and RBC present possibly in setting recent passed stone  Urine culture GNR  Trend CBC, VS  IVF  IV Ceftriaxone    Bacteremia due to Gram-negative bacteria  Assessment & Plan  See under sepsis    Kidney stone  Assessment & Plan  Presents with 2 days left flank pain, N/V. CT A/P shows evidence of possible recently passed kidney stone, she also has additional nonobstructing 8 mm left renal calculus. Follow up KUB was reviewed by IR, and appears to have b/l excretion  Symptoms had improved  Repeat Kidney US 7/31 showed resolved left hydronephrosis  Overnight 7/31 worsened left flank pain - now improved  Appreciate Urology input  Repeat CT renal stone pending  Will need Urology follow up given kidney  stone      Hypokalemia  Assessment & Plan  Suspect due to N/V  Replete and trend    Pulmonary nodule  Assessment & Plan  Incidental findings  There are 2 small pulmonary nodules within the right lower lobe, the larger of which measures 4 mm.   Follow up Chest CT as outpatient    Anxiety  Assessment & Plan  Uses medical marijuana  Will utilize PRN atarax               VTE Pharmacologic Prophylaxis: VTE Score: 3 Moderate Risk (Score 3-4) - Pharmacological DVT Prophylaxis Ordered: enoxaparin (Lovenox).    Mobility:   Basic Mobility Inpatient Raw Score: 24  JH-HLM Goal: 8: Walk 250 feet or more  JH-HLM Achieved: 7: Walk 25 feet or more  JH-HLM Goal NOT achieved. Continue with multidisciplinary rounding and encourage appropriate mobility to improve upon JH-HLM goals.    Patient Centered Rounds: I performed bedside rounds with nursing staff today.   Discussions with Specialists or Other Care Team Provider: none    Education and Discussions with Family / Patient: Patient declined call to .     Total Time Spent on Date of Encounter in care of patient: 32 mins. This time was spent on one or more of the following: performing physical exam; counseling and coordination of care; obtaining or reviewing history; documenting in the medical record; reviewing/ordering tests, medications or procedures; communicating with other healthcare professionals and discussing with patient's family/caregivers.    Current Length of Stay: 2 day(s)  Current Patient Status: Inpatient   Certification Statement: The patient will continue to require additional inpatient hospital stay due to sepsis, bacteremia, pyelo  Discharge Plan: Anticipate discharge tomorrow to home.    Code Status: Level 1 - Full Code    Subjective:   Patient reports worsening pain overnight, which has improved and feels much better this morning, she notes that she had some creamy feeling in her urine similar to prior episodes of passing stone.    Objective:      Vitals:   Temp (24hrs), Av.4 °F (37.4 °C), Min:98.1 °F (36.7 °C), Max:101.9 °F (38.8 °C)    Temp:  [98.1 °F (36.7 °C)-101.9 °F (38.8 °C)] 98.3 °F (36.8 °C)  HR:  [74-88] 85  Resp:  [18] 18  BP: (104-165)/(79-95) 116/88  SpO2:  [97 %-100 %] 98 %  Body mass index is 33.75 kg/m².     Input and Output Summary (last 24 hours):     Intake/Output Summary (Last 24 hours) at 2024 1050  Last data filed at 2024 1012  Gross per 24 hour   Intake 720 ml   Output 800 ml   Net -80 ml       Physical Exam:   Physical Exam  Vitals and nursing note reviewed.   Constitutional:       General: She is not in acute distress.     Appearance: She is well-developed. She is not diaphoretic.   HENT:      Head: Normocephalic and atraumatic.   Eyes:      Conjunctiva/sclera: Conjunctivae normal.   Cardiovascular:      Rate and Rhythm: Normal rate and regular rhythm.      Heart sounds: No murmur heard.  Pulmonary:      Effort: Pulmonary effort is normal. No respiratory distress.      Breath sounds: Normal breath sounds.   Abdominal:      General: There is no distension.      Palpations: Abdomen is soft.      Tenderness: There is no abdominal tenderness.   Musculoskeletal:         General: No swelling.      Cervical back: Neck supple.   Skin:     General: Skin is warm.   Neurological:      Mental Status: She is alert.   Psychiatric:         Mood and Affect: Mood normal.          Additional Data:     Labs:  Results from last 7 days   Lab Units 24  04424  04524  1213   WBC Thousand/uL 10.46*   < > 16.62*   HEMOGLOBIN g/dL 9.5*   < > 11.7   HEMATOCRIT % 30.2*   < > 36.3   PLATELETS Thousands/uL 208   < > 207   LYMPHO PCT %  --   --  3*   MONO PCT %  --   --  1*   EOS PCT %  --   --  0    < > = values in this interval not displayed.     Results from last 7 days   Lab Units 24  04424  0454 24  1214   SODIUM mmol/L 136   < > 133*   POTASSIUM mmol/L 3.3*   < > 3.2*   CHLORIDE mmol/L 106   < > 103    CO2 mmol/L 20*   < > 15*   BUN mg/dL 6   < > 10   CREATININE mg/dL 0.89   < > 1.07   ANION GAP mmol/L 10   < > 15*   CALCIUM mg/dL 8.9   < > 9.8   ALBUMIN g/dL  --   --  4.1   TOTAL BILIRUBIN mg/dL  --   --  1.11*   ALK PHOS U/L  --   --  68   ALT U/L  --   --  9   AST U/L  --   --  9*   GLUCOSE RANDOM mg/dL 102   < > 192*    < > = values in this interval not displayed.     Results from last 7 days   Lab Units 07/30/24  1228   INR  1.31*             Results from last 7 days   Lab Units 08/01/24  0441 07/30/24  1501 07/30/24  1228 07/30/24  1221   LACTIC ACID mmol/L  --  1.1  --  2.5*   PROCALCITONIN ng/ml 4.46*  --  5.25*  --        Lines/Drains:  Invasive Devices       Peripheral Intravenous Line  Duration             Peripheral IV 08/01/24 Dorsal (posterior);Right Hand <1 day                          Imaging: No pertinent imaging reviewed.    Recent Cultures (last 7 days):   Results from last 7 days   Lab Units 07/30/24 2014 07/30/24  1228   GRAM STAIN RESULT   --  Gram positive cocci in clusters*  Gram negative rods*   URINE CULTURE  >100,000 cfu/ml Gram Negative Floyd Enteric Like*  --        Last 24 Hours Medication List:   Current Facility-Administered Medications   Medication Dose Route Frequency Provider Last Rate    acetaminophen  650 mg Oral Q6H PRN Austyn Chawla MD      cefTRIAXone  2,000 mg Intravenous Q24H Nils Elmer Counts, DO 2,000 mg (08/01/24 0906)    enoxaparin  40 mg Subcutaneous Daily Nils Elmer Counts, DO      hydrOXYzine HCL  25 mg Oral Q6H PRN Nils Elmer Counts, DO      ketorolac  15 mg Intravenous Q6H PRN Jovon Mark PA-C      lactated ringers  125 mL/hr Intravenous Continuous Nils Elmer Counts,  mL/hr (08/01/24 0223)    ondansetron  4 mg Intravenous Q6H PRN Jovon Mark PA-C          Today, Patient Was Seen By: Nils Lambert DO    **Please Note: This note may have been constructed using a voice recognition system.**

## 2024-08-01 NOTE — UTILIZATION REVIEW
NOTIFICATION OF INPATIENT ADMISSION   AUTHORIZATION REQUEST   SERVICING FACILITY:   [place] [address]  Tax ID:  [id number]  NPI: [id number] ATTENDING PROVIDER:  Attending Name and NPI#: Nils LambertDo [[id number]]  Address: [address] [place] [address]  Phone: [phone]     ADMISSION INFORMATION:  [facility] of Service: [facility]  Place of Service Code: 21  Inpatient Admission Date/Time: 7/30/24  5:43 PM  Discharge Date/Time: No discharge date for patient encounter.  Admitting Diagnosis Code/Description:  Hypokalemia [E87.6]  Flank pain [R10.9]  Renal calculus, left [N20.0]  Pulmonary nodule, right [R91.1]  Sepsis (HCC) [A41.9]  Pyelonephritis of left kidney [N12]     UTILIZATION REVIEW CONTACT:  Bebo Costello, Utilization   Network Utilization Review Department  Phone: [phone]  Fax [phone]  Email: [email or web address]  Contact for approvals/pending authorizations, clinical reviews, and discharge.     PHYSICIAN ADVISORY SERVICES:  Medical Necessity Denial & Cgyj-mk-Fjsi Review  Phone: [phone]  Fax: [phone]  Email: [email or web address]     DISCHARGE SUPPORT TEAM:  For Patients Discharge Needs & Updates  Phone: [phone] opt. 2 Fax: [phone]  Email: [email or web address]

## 2024-08-02 VITALS
DIASTOLIC BLOOD PRESSURE: 78 MMHG | RESPIRATION RATE: 18 BRPM | OXYGEN SATURATION: 98 % | TEMPERATURE: 98.2 F | BODY MASS INDEX: 33.96 KG/M2 | WEIGHT: 184.53 LBS | HEART RATE: 73 BPM | SYSTOLIC BLOOD PRESSURE: 147 MMHG | HEIGHT: 62 IN

## 2024-08-02 LAB
ANION GAP SERPL CALCULATED.3IONS-SCNC: 11 MMOL/L (ref 4–13)
BACTERIA BLD CULT: ABNORMAL
BACTERIA BLD CULT: ABNORMAL
BACTERIA UR CULT: ABNORMAL
BUN SERPL-MCNC: 6 MG/DL (ref 5–25)
CALCIUM SERPL-MCNC: 8.8 MG/DL (ref 8.4–10.2)
CHLORIDE SERPL-SCNC: 105 MMOL/L (ref 96–108)
CO2 SERPL-SCNC: 21 MMOL/L (ref 21–32)
CREAT SERPL-MCNC: 0.81 MG/DL (ref 0.6–1.3)
E COLI DNA BLD POS QL NAA+NON-PROBE: DETECTED
ERYTHROCYTE [DISTWIDTH] IN BLOOD BY AUTOMATED COUNT: 13.9 % (ref 11.6–15.1)
GFR SERPL CREATININE-BSD FRML MDRD: 89 ML/MIN/1.73SQ M
GLUCOSE SERPL-MCNC: 96 MG/DL (ref 65–140)
GRAM STN SPEC: ABNORMAL
GRAM STN SPEC: ABNORMAL
HCT VFR BLD AUTO: 29.1 % (ref 34.8–46.1)
HGB BLD-MCNC: 9.1 G/DL (ref 11.5–15.4)
MCH RBC QN AUTO: 25.8 PG (ref 26.8–34.3)
MCHC RBC AUTO-ENTMCNC: 31.3 G/DL (ref 31.4–37.4)
MCV RBC AUTO: 82 FL (ref 82–98)
PLATELET # BLD AUTO: 209 THOUSANDS/UL (ref 149–390)
PMV BLD AUTO: 11.9 FL (ref 8.9–12.7)
POTASSIUM SERPL-SCNC: 3.5 MMOL/L (ref 3.5–5.3)
RBC # BLD AUTO: 3.53 MILLION/UL (ref 3.81–5.12)
S AUREUS+CONS DNA BLD POS NAA+NON-PROBE: DETECTED
SODIUM SERPL-SCNC: 137 MMOL/L (ref 135–147)
WBC # BLD AUTO: 8 THOUSAND/UL (ref 4.31–10.16)

## 2024-08-02 PROCEDURE — 99232 SBSQ HOSP IP/OBS MODERATE 35: CPT | Performed by: UROLOGY

## 2024-08-02 PROCEDURE — 99239 HOSP IP/OBS DSCHRG MGMT >30: CPT | Performed by: HOSPITALIST

## 2024-08-02 PROCEDURE — 80048 BASIC METABOLIC PNL TOTAL CA: CPT | Performed by: HOSPITALIST

## 2024-08-02 PROCEDURE — 85027 COMPLETE CBC AUTOMATED: CPT | Performed by: HOSPITALIST

## 2024-08-02 RX ORDER — CEFDINIR 300 MG/1
300 CAPSULE ORAL EVERY 12 HOURS SCHEDULED
Qty: 24 CAPSULE | Refills: 0 | Status: SHIPPED | OUTPATIENT
Start: 2024-08-02 | End: 2024-08-14

## 2024-08-02 RX ORDER — ONDANSETRON 4 MG/1
4 TABLET, FILM COATED ORAL EVERY 8 HOURS PRN
Qty: 20 TABLET | Refills: 0 | Status: SHIPPED | OUTPATIENT
Start: 2024-08-02

## 2024-08-02 RX ADMIN — CEFTRIAXONE 2000 MG: 2 INJECTION, SOLUTION INTRAVENOUS at 09:11

## 2024-08-02 RX ADMIN — ENOXAPARIN SODIUM 40 MG: 40 INJECTION SUBCUTANEOUS at 09:11

## 2024-08-02 RX ADMIN — SODIUM CHLORIDE, SODIUM LACTATE, POTASSIUM CHLORIDE, AND CALCIUM CHLORIDE 125 ML/HR: .6; .31; .03; .02 INJECTION, SOLUTION INTRAVENOUS at 06:09

## 2024-08-02 RX ADMIN — ACETAMINOPHEN 650 MG: 325 TABLET, FILM COATED ORAL at 06:48

## 2024-08-02 NOTE — ASSESSMENT & PLAN NOTE
Tachycardic, leukocytosis, lactic acid 2.5 on admission. Patient did receive 30 cc/kg in ED with 2L IVF, IV Tylenol, and IV Antibiotics  Suspected source pyelonephritis, and GNR bacteremia   Procalcitonin 5.25 - trending down  Blood cultures 1/2 E.coli - pan-sensitive  1/2 bottles CoNS - suspect contaminate  Empirically treated with Ceftriaxone  Await culture sensitivities, switch to PO cefdinir on discharge, to complete 2 week course total

## 2024-08-02 NOTE — CASE MANAGEMENT
Case Management Discharge Planning Note    Patient name Leigh Torres  Location /419-01 MRN [id number]  : 1980 Date 2024       Current Admission Date: 2024  Current Admission Diagnosis:Sepsis without acute organ dysfunction (HCC)   Patient Active Problem List    Diagnosis Date Noted Date Diagnosed    Bacteremia due to Gram-negative bacteria 2024     Anxiety 2024     Sepsis without acute organ dysfunction (HCC) 2024     Pyelonephritis 2024     Kidney stone 2024     Pulmonary nodule 2024     Hypokalemia 2024       LOS (days): 3  Geometric Mean LOS (GMLOS) (days): 3.6  Days to GMLOS:0.9     OBJECTIVE:  Risk of Unplanned Readmission Score: 7.52         Current admission status: Inpatient   Preferred Pharmacy:   [id number] - [place], [place] - [address]  [address] [place] [address]  Phone: [phone] Fax: [phone]    Primary Care Provider: No primary care provider on file.    Primary Insurance: [employer] [facility]  Secondary Insurance:     DISCHARGE DETAILS:  Pt is being dc'd home on this date and will be following up with Urology as an OP.

## 2024-08-02 NOTE — ASSESSMENT & PLAN NOTE
Suspected, due to possible recently passed kidney stone  CT A/P: there is nonspecific urothelial thickening and hyperemia at the left ureterovesicular junction. While this potentially represents a recently passed calculus, the focal urothelial thickening could alternatively represent inflammation or less likely focal malignancy. The renal parenchymal heterogeneity is concerning for potential superimposed pyelonephritis.  UA with only occasional bacteria, WBC and RBC present possibly in setting recent passed stone  Urine culture E.coli  See under sepsis
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
## 2024-08-02 NOTE — ASSESSMENT & PLAN NOTE
Presents with 2 days left flank pain, N/V. CT A/P shows evidence of possible recently passed kidney stone, she also has additional nonobstructing 8 mm left renal calculus. Follow up KUB was reviewed by IR, and appears to have b/l excretion  Symptoms had improved  Repeat Kidney US 7/31 showed resolved left hydronephrosis  Overnight 7/31 worsened left flank pain - now improved  Repeat CT renal stone protocol shows resolved hydronephrosis, Persistent mild left urothelial thickening and perinephric stranding   Appreciate Urology input  Will need Urology follow up given kidney stone, Ambulatory Referral placed on discharge

## 2024-08-02 NOTE — DISCHARGE INSTR - AVS FIRST PAGE
- Complete antibiotic course as prescribed  - Follow up with your PCP  - Follow up with Urology in 4 weeks

## 2024-08-02 NOTE — PROGRESS NOTES
"Progress Note - Urology  Leigh Torres 44 y.o. female MRN: [id number]  Unit/Bed#: 419-01 Encounter: [id number]    ASSESSMENT/PLAN:  HD#3    UTI  Pyelonephritis   -UA 7/30 cx growing >100,000 cfu/ml Ecoli    Left sided hydronephrosis-resolved   Left urothelial thickening  -kidney fx at baseline, no KJ  - repeat episode of L flank pain yesterday so repeat CTAP obtained.   Findings: \"Resolution of the left hydroureteronephrosis.  Persistent mild left urothelial thickening and perinephric stranding.  Nonobstructive 9 mm left renal calculus.\"    Medical management, supportive care  No operative intervention indicated at this time  IV abx, fluids  Follow up blood and urine cx and tailor abx accordingly  Monitor AM labs, vitals, trend fever curve  Discharge when medically appropriate  She will need outpatient follow up with urology for findings of urothelial thickening. Will need follow up imaging.  Urology will signoff at this time. Please reach out with questions.      Nephrolithiasis   - history of kidney stones starting in Oct 2023, all spontaneously passed without need for additional intervention  - repeat CT scan yesterday showed no hydro, persistent stable lower pole renal stone      Calculi present in L kidney on imaging , will need outpatient follow up with urology   Strain all urine    Bacteremia  -blood cx collected 7/30. Cx grew 1/2 Ecoli, 1/2 Staph coagulase neg     Sepsis, suspected  source--resolved  - aeb tachycardia, fever, leukocytosis POA  - fevers have resolved, afebrile x 24 hrs, rest of VSS  - blood cx  #1 + G- rods, e coli detected and #2 + G+ cocci in clusters, staph detected  - urine cx pending  - leukocytosis resolved WBC 8 (10.4, 13.2, 16.6)     IV abx, fluids  Follow up cultures   Trend AM labs, monitor vitals, trend fever curve   Management per primary team      D/w Mandie Cook urology [facility]     SUBJECTIVE:  Feeling well. No acute complaints. No abdominal pain, back pain, nausea. " "Tolerating diet. Voiding, no dysuria. Says her urine was \"coffee colored\" the other day but now it is back to normal.     OBJECTIVE:   Vitals:  Blood pressure 147/78, pulse 73, temperature 98.2 °F (36.8 °C), temperature source Oral, resp. rate 18, height 5' 2\" (1.575 m), weight 83.7 kg (184 lb 8.4 oz), last menstrual period 07/23/2024, SpO2 98%.,Body mass index is 33.75 kg/m².    I/Os:    Intake/Output Summary (Last 24 hours) at 8/2/2024 0819  Last data filed at 8/2/2024 0609  Gross per 24 hour   Intake 6840 ml   Output 400 ml   Net 6440 ml       Lines/Drains:  Invasive Devices       Peripheral Intravenous Line  Duration             Peripheral IV 08/01/24 Dorsal (posterior);Right Hand 1 day                    Physical Exam  Vitals reviewed.   Constitutional:       General: She is not in acute distress.     Appearance: She is not toxic-appearing.   Cardiovascular:      Rate and Rhythm: Normal rate.   Pulmonary:      Effort: Pulmonary effort is normal.   Abdominal:      Tenderness: There is abdominal tenderness (mild with deep palpation left of periumbilical area.). There is no right CVA tenderness, left CVA tenderness, guarding or rebound.   Skin:     General: Skin is warm and dry.   Neurological:      General: No focal deficit present.      Mental Status: She is alert.   Psychiatric:      Comments: Pleasant          Diagnostics:  I have personally reviewed pertinent lab results.     CT renal stone study abdomen pelvis wo contrast    Result Date: 8/1/2024  Impression: Resolution of the left hydroureteronephrosis. Persistent mild left urothelial thickening and perinephric stranding. Nonobstructive 9 mm left renal calculus. Unchanged mild left retroperitoneal lymphadenopathy, possibly reactive. Other chronic findings, as per the body of the report. Workstation performed: [id number]     XR abdomen 1 view kub    Result Date: 7/31/2024  Impression: Moderate left hydronephrosis and hydroureter. Mild narrowing at the left " UPJ. Workstation performed: [id number]     US kidney and bladder    Result Date: 7/30/2024  Impression: Resolved left hydronephrosis. Workstation performed: [id number]     CT abdomen pelvis with contrast    Result Date: 7/30/2024  Impression: 1.  Moderate left renal pelvocaliectasis, with heterogeneous enhancement throughout the left renal parenchyma. While there is no discrete obstructing calculus, there is nonspecific urothelial thickening and hyperemia at the left ureterovesicular junction. While this potentially represents a recently passed calculus, the focal urothelial thickening could alternatively represent inflammation or less likely focal malignancy. The renal parenchymal heterogeneity is concerning for potential superimposed pyelonephritis. 2.  There is an additional nonobstructing 8 mm left renal calculus. 3.  There are 2 small pulmonary nodules within the right lower lobe, the larger of which measures 4 mm. A baseline follow-up chest CT is recommended for further evaluation. Workstation performed: [id number]     Recent Results (from the past 36 hour(s))   CBC and Platelet    Collection Time: 08/01/24  4:41 AM   Result Value Ref Range    WBC 10.46 (H) 4.31 - 10.16 Thousand/uL    RBC 3.67 (L) 3.81 - 5.12 Million/uL    Hemoglobin 9.5 (L) 11.5 - 15.4 g/dL    Hematocrit 30.2 (L) 34.8 - 46.1 %    MCV 82 82 - 98 fL    MCH 25.9 (L) 26.8 - 34.3 pg    MCHC 31.5 31.4 - 37.4 g/dL    RDW 13.6 11.6 - 15.1 %    Platelets 208 149 - 390 Thousands/uL    MPV 11.2 8.9 - 12.7 fL   Procalcitonin    Collection Time: 08/01/24  4:41 AM   Result Value Ref Range    Procalcitonin 4.46 (H) <=0.25 ng/ml   Basic metabolic panel    Collection Time: 08/01/24  4:41 AM   Result Value Ref Range    Sodium 136 135 - 147 mmol/L    Potassium 3.3 (L) 3.5 - 5.3 mmol/L    Chloride 106 96 - 108 mmol/L    CO2 20 (L) 21 - 32 mmol/L    ANION GAP 10 4 - 13 mmol/L    BUN 6 5 - 25 mg/dL    Creatinine 0.89 0.60 - 1.30 mg/dL    Glucose 102 65 - 140  mg/dL    Calcium 8.9 8.4 - 10.2 mg/dL    eGFR 79 ml/min/1.73sq m   CBC and Platelet    Collection Time: 08/02/24  4:54 AM   Result Value Ref Range    WBC 8.00 4.31 - 10.16 Thousand/uL    RBC 3.53 (L) 3.81 - 5.12 Million/uL    Hemoglobin 9.1 (L) 11.5 - 15.4 g/dL    Hematocrit 29.1 (L) 34.8 - 46.1 %    MCV 82 82 - 98 fL    MCH 25.8 (L) 26.8 - 34.3 pg    MCHC 31.3 (L) 31.4 - 37.4 g/dL    RDW 13.9 11.6 - 15.1 %    Platelets 209 149 - 390 Thousands/uL    MPV 11.9 8.9 - 12.7 fL   Basic metabolic panel    Collection Time: 08/02/24  4:54 AM   Result Value Ref Range    Sodium 137 135 - 147 mmol/L    Potassium 3.5 3.5 - 5.3 mmol/L    Chloride 105 96 - 108 mmol/L    CO2 21 21 - 32 mmol/L    ANION GAP 11 4 - 13 mmol/L    BUN 6 5 - 25 mg/dL    Creatinine 0.81 0.60 - 1.30 mg/dL    Glucose 96 65 - 140 mg/dL    Calcium 8.8 8.4 - 10.2 mg/dL    eGFR 89 ml/min/1.73sq m       Current Medications:  Scheduled Meds:  Current Facility-Administered Medications   Medication Dose Route Frequency Provider Last Rate    acetaminophen  650 mg Oral Q6H PRN Austyn Chawla MD      cefTRIAXone  2,000 mg Intravenous Q24H Nils Elmer Counts, DO 2,000 mg (08/01/24 0906)    enoxaparin  40 mg Subcutaneous Daily Nils Elmer Counts, DO      hydrOXYzine HCL  25 mg Oral Q6H PRN Nils Elmer Counts, DO      lactated ringers  125 mL/hr Intravenous Continuous Nils Elmer Counts,  mL/hr (08/02/24 0609)    ondansetron  4 mg Intravenous Q6H PRN Jovon Mark PA-C       Continuous Infusions:lactated ringers, 125 mL/hr, Last Rate: 125 mL/hr (08/02/24 0609)      PRN Meds:    acetaminophen    hydrOXYzine HCL    ondansetron      Kezia Mckeon PA-C  8/2/2024

## 2024-08-05 NOTE — UTILIZATION REVIEW
NOTIFICATION OF ADMISSION DISCHARGE   This is a Notification of Discharge from [facility]. Please be advised that this patient has been discharge from our facility. Below you will find the admission and discharge date and time including the patient’s disposition.   UTILIZATION REVIEW CONTACT:  Bebo Costello  Utilization   Network Utilization Review Department  Phone: [phone] x carefully listen to the prompts. All voicemails are confidential.  Email: [email or web address]     ADMISSION INFORMATION  PRESENTATION DATE: 7/30/2024 11:52 AM  OBERVATION ADMISSION DATE: N/A  INPATIENT ADMISSION DATE: 7/30/24  5:43 PM   DISCHARGE DATE: 8/2/2024 11:47 AM   DISPOSITION:Home/Self Care    Network Utilization Review Department  ATTENTION: Please call with any questions or concerns to [phone] and carefully listen to the prompts so that you are directed to the right person. All voicemails are confidential.   For Discharge needs, contact Care Management DC Support Team at [phone] opt. 2  Send all requests for admission clinical reviews, approved or denied determinations and any other requests to dedicated fax number below belonging to the campus where the patient is receiving treatment. List of dedicated fax numbers for the Facilities:  FACILITY NAME UR FAX NUMBER   ADMISSION DENIALS (Administrative/Medical Necessity) [phone]   DISCHARGE SUPPORT TEAM ([facility]) [phone]   PARENT CHILD HEALTH (Maternity/NICU/Pediatrics) [phone]   [facility] [phone]   [facility] [phone]   [facility] [phone]   [facility] [phone]   [facility] [phone]   [facility] [phone]   [facility] [phone]   [facility] [phone]   [facility]  [facility] [phone]   [facility] [phone]   [facility] [phone]   [facility] [phone]

## 2024-08-05 NOTE — TELEPHONE ENCOUNTER
Called and left voicemail message for patient to return call to the office to schedule a NP appointment with ROSITA Thompson in 4-6 weeks. Patient will need a CT renal protocol completed 1 week prior to her appointment (order is in and patient can be provided with central scheduling phone number).   
[facility] admission followup- Ms Torres will need new patient appt to discuss left pyelonehpritis, hydro and ureteral thickening. Was able to be treated with antibiotics with no stent and no nephrostomy tube.     Will need ct urogram in 4-6 weeks to recheck the hydroureter/UVJ thickening followed by kvng appt then to discuss results. if persistent will discuss diagnostic ureteroscopy.      
Patient remains admitted to [facility]. Will continue to follow.  
Patient returned call and spoke with PEP. She was scheduled for an appointment on 9/18/24 @ 1:40 pm with ROSITA Thompson and was provided with the central scheduling phone number to schedule imaging prior to visit.   
Plan to follow for discharge, then call to schedule an appointment with imaging prior.   
none

## 2024-08-13 NOTE — UTILIZATION REVIEW
NOTIFICATION OF INPATIENT ADMISSION   AUTHORIZATION REQUEST   SERVICING FACILITY:   [place] [address]  Tax ID:  [id number]  NPI: [id number] ATTENDING PROVIDER:  Attending Name and NPI#: Nils Lambert  [[id number]]  Address: [address] [place] [address]  Phone: [phone]     ADMISSION INFORMATION:  Place of Service: [facility]  Place of Service Code: 21  Inpatient Admission Date/Time: 7/30/24  5:43 PM  Discharge Date/Time: 8/2/2024 11:47 AM  Admitting Diagnosis Code/Description:  Hypokalemia [E87.6]  Flank pain [R10.9]  Renal calculus, left [N20.0]  Pulmonary nodule, right [R91.1]  Sepsis (HCC) [A41.9]  Pyelonephritis of left kidney [N12]     UTILIZATION REVIEW CONTACT:  Bebo Costello, Utilization   Network Utilization Review Department  Phone: [phone]  Fax [phone]  Email: [email or web address]  Contact for approvals/pending authorizations, clinical reviews, and discharge.     PHYSICIAN ADVISORY SERVICES:  Medical Necessity Denial & [id number] Review  Phone: [phone]  Fax: [phone]  Email: [email or web address]     DISCHARGE SUPPORT TEAM:  For Patients Discharge Needs & Updates  Phone: [phone] opt. 2 Fax: [phone]  Email: [email or web address]